# Patient Record
Sex: FEMALE | Race: WHITE
[De-identification: names, ages, dates, MRNs, and addresses within clinical notes are randomized per-mention and may not be internally consistent; named-entity substitution may affect disease eponyms.]

---

## 2021-09-09 ENCOUNTER — HOSPITAL ENCOUNTER (OUTPATIENT)
Age: 41
End: 2021-09-09
Payer: COMMERCIAL

## 2021-09-09 DIAGNOSIS — R50.9: Primary | ICD-10-CM

## 2021-09-09 PROCEDURE — 87635 SARS-COV-2 COVID-19 AMP PRB: CPT

## 2021-09-09 PROCEDURE — U0003 INFECTIOUS AGENT DETECTION BY NUCLEIC ACID (DNA OR RNA); SEVERE ACUTE RESPIRATORY SYNDROME CORONAVIRUS 2 (SARS-COV-2) (CORONAVIRUS DISEASE [COVID-19]), AMPLIFIED PROBE TECHNIQUE, MAKING USE OF HIGH THROUGHPUT TECHNOLOGIES AS DESCRIBED BY CMS-2020-01-R: HCPCS

## 2021-09-09 PROCEDURE — U0005 INFEC AGEN DETEC AMPLI PROBE: HCPCS

## 2024-08-01 ENCOUNTER — HOSPITAL ENCOUNTER (OUTPATIENT)
Age: 44
Discharge: HOME | End: 2024-08-01
Payer: COMMERCIAL

## 2024-08-01 DIAGNOSIS — R30.0: Primary | ICD-10-CM

## 2024-08-01 LAB
LEUKOCYTE ESTERASE UR QL STRIP: 500 /UL
MUCOUS THREADS URNS QL MICRO: (no result) /HPF
RBC UR QL: (no result) /HPF (ref 0–5)
SP GR UR: 1.01 (ref 1–1.03)
SQUAMOUS URNS QL MICRO: (no result) /HPF (ref 5–10)
URINE PRESERVATIVE: (no result)

## 2024-08-01 PROCEDURE — 87088 URINE BACTERIA CULTURE: CPT

## 2024-08-01 PROCEDURE — 87086 URINE CULTURE/COLONY COUNT: CPT

## 2024-08-01 PROCEDURE — 81001 URINALYSIS AUTO W/SCOPE: CPT

## 2024-08-01 NOTE — XMS RPT_ITS
Comprehensive CCD (C-CDA v2.1)  
  
                           Created on: 2024  
  
  
AmadoSHARON  
External Reference #: CDR,PersonID:[id number]  
: 1980  
Sex: Female  
  
Demographics  
  
  
                                        Address             [address] [place] [address]  [address]  
   
                                        Home Phone          [phone]  
   
                                        Mobile Phone        [phone]  
   
                                        Preferred Language  en  
   
                                        Marital Status        
   
                                        Caodaism Affiliation Unknown  
   
                                        Race                White  
   
                                        Ethnic Group        Not  or Lati  
no  
  
  
Author  
  
  
                                        Organization        [facility]  
  
  
Care Team Providers  
  
  
                                Care Team Member Name Role            Phone  
   
                                Anup Roberts Unavailable     Unavailable  
   
                                Nwaokafor, Ulisses [place] Unavailable     Unav  
ailable  
   
                                [place], Jana Unavailable     Unavailable  
   
                                Abbas, Rolfjjahid Unavailable     Unavailable  
   
                                Nwaokafor, Ulisses C Unavailable     Unavailable  
   
                                [employer]    Unavailable     Unavailable  
   
                                Corrao, Caitlyn    Unavailable     Unavailable  
   
                                Unavailable     Primary Care Provider UnavailErica Freed MD Primary Care Provider Unatiffany leavitt  
   
                                PROVIDER, UNKNOWN Admitting       Unavailable  
   
                                MERRICK BOSTON Attending       Unavailable  
   
                                Unavailable     Primary Care Provider Unavailabl  
e  
   
                                Unavailable     Primary Care Provider Unavailabl  
e  
  
  
  
Allergies  
  
  
                                                    Allergy   
Classification                          Reported   
Allergen(s)               Allergy Type              Date of   
Onset                     Reaction(s)               Facility  
   
                                                    Penicillins   
(antibiotic)  
(1 source)          Penicillins         Drug Allergy          
0                                       Hives, Nausea   
And Vomiting                            [facility]  
   
                                                    Sulfonamides   
(antibiotic)  
(1 source)                              Sulfonamides   
(Antibiotic)              Drug Allergy                
0                                                   [facility]  
   
                                                      
(3 sources)                             Sulfonamides   
(Antibiotic)    drug allergy                                    [facility]  
[facility]  
Work Phone:   
[phone]  
   
                                                      
(3 sources)                             Amoxicillin;   
Translations:   
[AMOXICILLIN]             Drug Allergy                
0                         Hives                     The   
[facility]   
[facility]   
[facility]  
   
                                                      
(3 sources)                             Penicillins;   
Translations:   
[PENICILLINS]                           Propensity to   
adverse   
reactions to   
drug   
(disorder)                                
0                         Hives                     The   
[facility]   
[facility]   
[facility]  
   
                                                      
(3 sources)                             Sulfonamides   
(Antibiotic);   
Translations:   
[SULFA   
ANTIBIOTICS]                            Propensity to   
adverse   
reactions to   
drug   
(disorder)                                
0                                                   The   
[facility]   
[facility]   
[facility]  
  
  
  
Medications  
Current Medications  
  
  
  
                      Medication Drug Class(es) Dates      Sig (Normalized) Sig   
(Original)  
   
                                                    docusate sodium 100   
mg oral capsule  
(2 sources)                                         Start:   
2017                              take 1 capsule by   
mouth twice daily   
at mealtime                             docusate sodium   
(COLACE) 100 MG   
capsule Take 1   
Capsule by mouth 2   
times daily (with   
meals). 60 Capsule   
3 2017   
Active  
   
                                                    escitalopram 20 mg   
oral tablet  
(1 source)                              Serotonin Reuptake   
Inhibitor                               Start:   
2021                              take 1 tablet by   
mouth once daily                        escitalopram   
(LEXAPRO) 20 MG   
tablet take 1   
tablet by mouth   
once daily 0   
2021 Active  
   
                                                    ferrous sulfate 325   
mg oral tablet  
(5 sources)                                         Start:   
2017                              take 1 tablet by   
mouth twice daily   
at mealtime                             ferrous sulfate   
325 (65 FE) MG   
tablet Take 1   
Tablet by mouth 2   
times daily (with   
meals). 60 Tablet   
3 2017   
Active  
   
                                                    ibuprofen 600 mg   
oral tablet  
(5 sources)                             Nonsteroidal   
Anti-inflammatory   
Drug                                    Start:   
2017                              take 1 tablet by   
mouth every six   
hours as needed                         ibuprofen (MOTRIN)   
600 MG tablet Take   
1 Tablet by mouth   
every 6 hours as   
needed (For cramps   
and engorgement).   
30 Tablet 3   
2017 Active  
  
  
  
                                                take 5 tablets by mouth once svitlana  
ly Ibuprofen 200 MG Oral Tablet 5 tablets daily  
  
Refills: 0 Active  
  
  
  
                                                    PRENATAL TABS tablet  
(2 sources)                             Start: 2017   take 1 tablet by   
mouth once daily                        PRENATAL TABS tablet   
Take 1 Tablet by mouth   
daily. 30 Tablet 11   
2017 Active  
  
  
  
Completed/Discontinued Medications  
  
  
  
                      Medication Drug Class(es) Dates      Sig (Normalized) Sig   
(Original)  
   
                                                    ascorbic acid 500 mg   
chewable tablet  
(3 sources)               Vitamin C                 Start:   
2019                              take 1 tablet by   
mouth three times   
daily                                   Vitamin C Oral   
Tablet Chewable TAKE   
1 TABLET 3 times   
daily Take 1 tablet   
with your Iron each   
time Quantity: 90   
Refills: 5 Leydi Luong MD Start :   
2019 Active  
   
                                                    gabapentin 100 mg   
oral capsule  
(3 sources)                             Anti-epileptic   
Agent                                   Start:   
2019                              take 3 capsules by   
mouth at bedtime,   
then take 1   
capsule by mouth   
three times daily                       Gabapentin 100 MG   
Oral Capsule Take 3   
capsules at bedtime   
starting 2 days   
before surgery. Take   
1 capsule 3 times   
daily after surgery,   
open capsule, take   
with SF pudding   
Quantity: 27   
Refills: 0 Leydi Luong MD Start :   
12-Dec-2019 Active  
   
                                                    omeprazole 40 mg   
delayed release oral   
capsule  
(6 sources)                             Proton Pump   
Inhibitor                               Start:   
2019                              take 1 capsule by   
mouth once daily                        Omeprazole 40 MG   
Oral Capsule Delayed   
Release TAKE 1   
CAPSULE Daily Open   
capsule, sprinkle in   
SF applesauce or   
pudding, swallow. DO   
NOT CHEW Quantity:   
30 Refills: 2 Leydi Luong MD Start :   
12-Dec-2019 Active  
   
                                                    ondansetron 4 mg   
disintegrating oral   
tablet  
(3 sources)                             Serotonin-3   
Receptor   
Antagonist                              Start:   
2019                              take 1-2 tablets   
by mouth every   
eight hours as   
needed for nausea                       Ondansetron 4 MG   
Oral Tablet   
Disintegrating take   
1-2 tablets every 8   
hours as needed for   
nausea Quantity: 30   
Refills: 2 Leydi Luong MD Start :   
12-Dec-2019 Active  
   
                                                    oxyCODONE   
hydrochloride 1   
mg/ml oral solution  
(3 sources)               Opioid Agonist            Start:   
2019                              take 5 mL by mouth   
every four hours   
as needed for pain                      oxyCODONE HCl - 5   
MG/5ML Oral Solution   
TAKE 5 ML Every 4   
hours PRN pain   
alternate with   
tylenol and heating   
pad Quantity: 180   
Refills: 0 Leydi Luong MD Start :   
12-Dec-2019 Active  
  
  
  
Problems  
Active Problems  
  
  
                                                    Problem   
Classification  Problem         Date            Documented Date Episodic/Chronic  
   
                                                    Coma; stupor; and   
brain damage  
(3 sources)                             Daytime somnolence;   
Translations:   
[Excessive daytime   
sleepiness]                                                 Episodic  
   
                                                    Diabetes or abnormal   
glucose tolerance   
complicating   
pregnancy;   
childbirth; or the   
puerperium  
(3 sources)                             Gestational diabetes   
mellitus;   
Translations:   
[Gestational diabetes   
mellitus]                                                   Episodic  
   
                                                    Esophageal disorders  
(3 sources)                             Gastroesophageal   
reflux disease;   
Translations: [GERD   
(gastroesophageal   
reflux disease)]                                            Chronic  
   
                                                    Headache; including   
migraine  
(9 sources)                             Migraine without aura;   
Translations:   
[Migraine]                                                  Chronic  
   
                                                    Headache; including   
migraine  
(3 sources)                             Daily headache;   
Translations: [Chronic   
daily headache]                                             Episodic  
   
                                                    Nausea and vomiting  
(3 sources)                             Postoperative nausea   
and vomiting;   
Translations:   
[Post-operative nausea   
and vomiting]                                               Episodic  
   
                                                    Nutritional   
deficiencies  
(3 sources)                             Vitamin D deficiency;   
Translations: [Vitamin   
D deficiency]                                               Chronic  
   
                                                    Other complications   
of pregnancy  
(3 sources)                             Multigravida of   
advanced maternal age;   
Translations: [AMA   
(advanced maternal   
age) multigravida 35+]                                         Episodic  
   
                                                    Other female genital   
disorders  
(1 source)                              Abnormal uterine   
bleeding;   
Translations:   
[Abnormal uterine and   
vaginal bleeding,   
unspecified]                                                Chronic  
   
                                                    Other nervous system   
disorders  
(3 sources)                             Postoperative pain ;   
Translations: [Post-op   
pain]                                                       Episodic  
   
                                                    Other nutritional;   
endocrine; and   
metabolic disorders  
(3 sources)                             Morbid obesity;   
Translations: [Morbid   
obesity]                                                    Chronic  
   
                                                    Other nutritional;   
endocrine; and   
metabolic disorders  
(3 sources)                             Body mass index 40+ -   
severely obese;   
Translations: [Morbid   
obesity with BMI of   
45.0-49.9, adult]                                           Chronic  
   
                                                    Other nutritional;   
endocrine; and   
metabolic disorders  
(3 sources)                             Disorder of iron   
metabolism;   
Translations:   
[Disorder of iron   
metabolism,   
unspecified]                                                Chronic  
   
                                                    Other nutritional;   
endocrine; and   
metabolic disorders  
(3 sources)                             Personal history of   
other endocrine,   
nutritional and   
metabolic disease;   
Translations: [History   
of vitamin D   
deficiency]                                                 Episodic  
   
                                                    Residual codes;   
unclassified  
(3 sources)                             Obstructive sleep   
apnea syndrome;   
Translations: [KATHY   
(obstructive sleep   
apnea)]                                                     Chronic  
   
                                                    Screening and   
history of mental   
health and substance   
abuse codes  
(3 sources)                             H/O: anxiety state;   
Translations: [History   
of anxiety]                                                 Episodic  
   
                                                    Sprains and strains  
(6 sources)                             Sprain of foot;   
Translations: [Sprain   
of ankle]                                                   Episodic  
   
                                                    Unclassified  
(1 source)                              Unspecified injury of   
unspecified foot,   
initial encounter /   
S99.929A(ICD-10)                        Onset:   
2018                                            
   
                                                    Unclassified  
(1 source)                              Unspecified injury of   
left foot, initial   
encounter /   
S99.922A(ICD-10)                        Onset:   
2018                                            
  
  
Past or Other Problems  
  
  
                                                    Problem   
Classification  Problem         Date            Documented Date Episodic/Chronic  
   
                                                    Early or threatened   
labor  
(2 sources)                             Labor finding;   
Translations: [False   
labor, unspecified]                     Onset:   
2017                Episodic  
   
                                                    Other complications   
of pregnancy  
(4 sources)                             High risk pregnancy;   
Translations:   
[Supervision of high   
risk pregnancy,   
unspecified, third   
trimester]                              Onset:   
2010  
Resolved:   
2016                Episodic  
   
                                                    Other hematologic   
conditions  
(2 sources)                             Alpha-fetoprotein   
raised;   
Translations:   
[Abnormality of   
alphafetoprotein]                       Onset:   
10-                10-                Episodic  
   
                                                    Other pregnancy and   
delivery including   
normal  
(2 sources)                             Vaginal delivery;   
Translations:   
[Encounter for   
full-term   
uncomplicated   
delivery]                               Onset:   
2015                Episodic  
   
                                                    Unclassified  
(1 source)                              Unspecified injury   
of unspecified foot,   
initial encounter;   
Translations:   
[Unspecified injury   
of unspecified foot,   
initial encounter]                      Onset:   
2018                                            
   
                                                    Unclassified  
(12 sources)                            Patient encounter   
status;   
Translations:   
[Pre-bariatric   
surgery nutrition   
evaluation]                                                   
   
                                                    Unclassified  
(3 sources)                             Injury of left foot;   
Translations: [Foot   
injury, left,   
initial encounter]                                            
   
                                                    Unclassified  
(3 sources)                             History of bypass of   
stomach;   
Translations: [S/P   
gastric bypass]                                               
   
                                                    NEGATED: Highlighted   
row has not   
occurred!Residual   
codes; unclassified  
(20 sources)    Disease                                         Episodic  
  
  
  
Results  
  
  
                          Test Name    Value        Interpretation Reference   
Range                                   Facility  
   
                                                    R[facility]IOon 2022   
   
                                        CNOV                Office Visit ([facility]  
)  
-------------------------  
-----  
SHARON FISCHER   
([id number]) 1980 F  
Date Time Provider   
Department  
22 11:40 AM [facility]  
During your visit today,   
we recorded the following   
information about you:  
Temperature Pulse   
Respiration Blood   
pressure  
98.4 degrees 62/minute   
12/minute 107/59  
Weight Last Period  
90.3 kg 21  
BAILEE Guzman.CNP   
2022 12:01 PM Signed  
This note was created   
using [employer].  
Subjective  
Sharon Fishcer is a 41   
year old female.  
Pt reports left sided   
throat pain and swelling   
x 2 days. Pain refers   
into left  
ear. No URI symptoms,   
otherwise feels well.  
The history is provided   
by the patient.  
Sore Throat  
This is a new problem.   
Episode onset: 2 days.   
The problem has been   
gradually  
worsening. The pain is   
worse on the left side.   
There has been no fever.  
Associated symptoms   
include ear pain (left),   
neck pain and swollen   
glands.  
Pertinent negatives   
include no congestion,   
coughing, headaches or   
trouble  
swallowing. She has had   
no exposure to strep. She   
has tried acetaminophen   
for  
the symptoms. The   
treatment provided mild   
relief.  
Review of Systems  
Constitutional: Negative   
for chills, fatigue and   
fever.  
HENT: Positive for ear   
pain (left) and sore   
throat. Negative for   
congestion,  
postnasal drip,   
rhinorrhea, sinus pain   
and trouble swallowing.  
Respiratory: Negative for   
cough.  
Cardiovascular: Negative   
for chest pain.  
Musculoskeletal: Positive   
for neck pain.  
Allergic/Immunologic:   
Negative for   
immunocompromised state.  
Neurological: Negative   
for headaches.  
Hematological: Positive   
for adenopathy.  
PAST MEDICAL HISTORY  
Diagnosis Date  
- Eczema  
PAST SURGICAL HISTORY  
Procedure Laterality Date  
- GASTRIC BYPASS HX   
2019  
- NONE  
ALLERGIES Amoxicillin   
(Bulk) and Sulfa   
(Sulfonamide Antibiotics)  
MEDICATIONS  
escitalopram oxalate   
(LEXAPRO) 20 mg tablet   
Take 1 tablet by mouth   
once daily.  
clindamycin (CLEOCIN) 300   
mg capsule Take 1 capsule   
by mouth three times   
daily  
for 7 days.  
prenatal vits   
w-ca,fe,fa(<1mg)(PRENATAL   
FORMULA TAB) Take one(1)   
tablet daily.  
History reviewed. No   
pertinent family history.  
Social History  
Tobacco Use  
- Smoking status: Current   
Every Day Smoker  
Types: Cigarettes  
- Smokeless tobacco:   
Never Used  
- Tobacco comment: 6 per   
day  
Substance Use Topics  
- Alcohol use: No  
- Drug use: No  
Objective  
/59   Pulse 62     
Temp 36.9 ?C (98.4 ?F)   
(Temporal Artery)   Resp   
12    
Wt 90.3 kg (199 lb)   LMP   
2021 (Approximate)   
  SpO2 100%  
Physical Exam  
Vitals and nursing note   
reviewed.  
Constitutional:  
Appearance: She is   
well-developed. She is   
not ill-appearing.  
HENT:  
Mouth/Throat:  
Mouth: Mucous membranes   
are moist.  
Pharynx: Uvula midline.   
Posterior oropharyngeal   
erythema present. No   
uvula  
swelling.  
Tonsils: No tonsillar   
exudate. 1+ on the right.   
3+ on the left.  
Pulmonary:  
Effort: Pulmonary effort   
is normal.  
Lymphadenopathy:  
Cervical: Cervical   
adenopathy present.  
Right cervical: No   
superficial or deep   
cervical adenopathy.  
Left cervical:   
Superficial cervical   
adenopathy and deep   
cervical adenopathy  
present.  
Skin:  
General: Skin is warm and   
dry.  
Neurological:  
Mental Status: She is   
alert and oriented to   
person, place, and time.  
Assessment and Plan  
1. Acute tonsillitis,   
unspecified etiology  
Concern for possible   
early tonsillar abscess,   
will begin antibiotic   
treatment.  
If severe worsening or   
inability to swallow   
saliva/liquids, go to ER.   
If no  
improvement in the next   
2-3 days, worsening, see   
ENT for further   
evaluation.  
Supportive care with   
analgesics, warm salt   
water gargles, throat  
lozenges/sprays,   
increased fluids, rest.  
- clindamycin (CLEOCIN)   
300 mg capsule; Take 1   
capsule by mouth three   
times  
daily for 7 days.   
Dispense: 21 capsule;   
Refill: 0  
Referring Provider: SELF   
[200]  
Allergies As of Date:   
2022 Noted Allergy   
Reaction  
AMOXICILLIN (BULK)   
2011 4 - Hives  
SULFA (SULFONAMIDE   
ANTIBIOTICS) 2010  
Date Reviewed: 2022  
Reviewed by: BAILEE Guzman.CNP - Fully   
Assessed  
Reason for Visit:  
Sore Throat [200] Cmt: x   
2 days  
Ear Pain [817] Cmt: left   
ear pain x 2 days. No   
fever  
Primary Visit   
Diagnosis:Acute   
tonsillitis, unspecified   
etiology [J03.90]  
Order(s):clindamycin   
(CLEOCIN) 300 mg   
capsuleTake 1 capsule by   
mouth three  
times daily for 7   
days.Disp: 21 capsuleRfl:   
0  
Prescriptions as of   
2022  
- escitalopram oxalate   
(LEXAPRO) 20 mg tablet  
Take 1 tablet by mouth   
once daily.  
- clindamycin (CLEOCIN)   
300 mg capsule  
Take 1 capsule by mouth   
three times daily for 7   
days.  
- prenatal vits   
w-ca,fe,fa(<1mg)(PRENATAL   
FORMULA TAB)  
Take one(1) tablet daily.  
Problem List As Of Date:   
2022  
(None)  
Prescriptions ordered   
this encounter Disp   
Refills Start End  
CLINDAMYCIN  MG   
CAPSULE 21 c* 0   
2022  
Route: ORAL  
Sig: Take 1 capsule by   
mouth three times daily   
for 7 days.  
Medicati (more content   
not included)...    Normal                                  [facility]  
   
                                                    CBC Auto DifferentialOrdered  
 By: Smiley Rodriguez on 05-   
   
                          Absolute Baso # 0.1 10*3/uL               0.0 - 0.2   
10*3/uL                                 [facility]  
Work Phone:   
[phone]  
22  
   
                          Absolute Neut # 2.5 10*3/uL               1.8 - 7.0   
10*3/uL                                 [facility]  
Work Phone:   
[phone]  
22  
   
                                                    Basophils/100 WBC   
(Bld)           1.2 %                           0.0 - 2.0 %     [facility]  
Work Phone:   
[phone]  
22  
   
                                                    Eosinophils (Bld)   
[#/Vol]             0.3 10*3/uL                             0.0 - 0.5   
10*3/uL                                 [facility]  
Work Phone:   
[phone]  
[phone]  
   
                                                    Eosinophils/100 WBC   
(Bld)           6.4 %           High            1.0 - 6.0 %     [facility]  
Work Phone:   
[phone]  
[phone]  
   
                                                    Granulocytes/100 WBC   
(Bld)               54.2 %                                  40.0 - 80.0   
%                                       [facility]  
Work Phone:   
1  
   
                                                    Hematocrit (Bld)   
[Volume fraction]   35.2 %                                  35.0 - 47.0   
%                                       [facility]  
Work Phone:   
1  
   
                                                    Hemoglobin.gastrointe  
stinal spec 1 Ql   
(Stl)               12.1 g/dL                               11.7 - 16.0   
g/dL                                    [facility]  
Work Phone:   
  
   
                                                    Interpretation and   
review of laboratory   
results         Abnormal                                        [facility]  
Work Phone:   
1  
   
                                                    Lymphocytes (Bld)   
[#/Vol]             1.4 10*3/uL                             1.0 - 4.3   
10*3/uL                                 [facility]  
Work Phone:   
1  
   
                                                    Lymphocytes/100 WBC   
(Bld)               30.8 %                                  20.0 - 40.0   
%                                       [facility]  
Work Phone:   
  
   
                                                    MCH (RBC) [Entitic   
mass]               30.1 pg                                 26.0 - 34.0   
pg                                      [facility]  
Work Phone:   
  
   
                          MCHC (RBC) [Mass/Vol] 34.5 %                    32.0 -  
 36.0   
%                                       [facility]  
Work Phone:   
  
   
                                                    MCV (RBC) [Entitic   
vol]                87.3 fL                                 79.0 - 98.0   
fL                                      [facility]  
Work Phone:   
  
   
                                                    Monocytes (Bld)   
[#/Vol]             0.3 10*3/uL                             0.0 - 0.8   
10*3/uL                                 [facility]  
Work Phone:   
  
   
                                                    Monocytes/100 WBC   
(Bld)               7.4 %                                   2.0 - 10.0   
%                                       [facility]  
Work Phone:   
  
   
                                                    Platelet distribution   
width (Bld) [Ratio] 12.3 %                                  11.5 - 14.5   
%                                       [facility]  
Work Phone:   
  
   
                                                    Platelet mean volume   
(Bld) [Entitic vol] 6.8 fL              Low                 7.4 - 10.4   
fL                                      [facility]  
Work Phone:   
  
   
                                                    Platelets (Bld)   
[#/Vol]             316 10*3/uL                             140 - 440   
10*3/uL                                 [facility]  
Work Phone:   
  
   
                          RBC (Bld) [#/Vol] 4.03 10*6/uL              3.80 - 5.2  
0   
10*6/uL                                 [facility]  
Work Phone:   
[phone]  
[phone]  
   
                          WBC (Bld) [#/Vol] 4.6 10*3/uL               3.6 - 10.7  
   
10*3/uL                                 [facility]  
Work Phone:   
[phone]  
  
   
                                                            Test Performed by [facility], [address] ,   
[place], [place] [address]                                         [facility]  
Work Phone:   
[phone]  
  
   
                                                    HCG, Quantitative, Pregnancy  
Ordered By: Smiley Rodriguez on 05-   
   
                      hCG Quant  <2                    m[IU]/mL   [facility]  
Work Phone:   
[phone]  
   
                                        Comment on above:   Females < 5  
Values in pregnancy should double every 2 to 3 days for  
the first 6 weeks.Elevated concentrations of human  
chorionic gonadotropin (hCG) measured in the first  
trimester of pregnancy are observed in normal pregnancy,  
but may serve as an indication of chorionic carcinoma,  
hydatiform mole, or multiple pregnancy.Decreasing hCG  
concentrations indicate threatened or missed ,  
recent termination of pregnancy, ectopic pregnancy,  
gestosis or intrauterine death. Jenni- and postmenopausal  
females may have detectable hCG concentrations  
(< or = to 14 mIU/mL) due to pituitary production of hCG.  
Serum follicle-stimulating hormone measurement may aid  
in ruling-out pregnancy in this population. Cutoffs of  
greater than 20 to 45 mIU/mL have been suggested and are  
method dependent. False-elevations (called phantom human  
chorionic gonadotropin: hCG) may occur with patients who  
have human antianimal or heterophilic antibodies.  
Some specimens may not dilute linearly due to abnormal  
forms of hCG. Elevated hCG concentrations not associated  
with pregnancy are found in patients with other diseases  
such as tumors of the germ cells, ovaries, bladder,  
pancreas, stomach, lungs, and liver. This test is not  
intended to detect or monitor tumors or gestational  
trophoblastic disease.  
   
   
                                                            Test Performed by [facility], [address] ,   
[place], [place] [address]                                         [employer] Phone:   
[phone]  
   
                                                    Hemogram w/ [facility] 05-10  
-2021   
   
                      Abs Baso Cnt 0.1 10*3/uL Normal     0.0-0.2    [facility]   
[facility]  
   
                                        Comment on above:   Performed By: #### Q  
WNT4, HEMDF, TSH5 ####  
[facility]  
[address]  
[place] [address]  
#### PRLA3 ####  
[facility]  
[address] [place] [address]   
   
                      Abs Neutrophile Cnt 2.5 10*3/uL Normal     1.8-7.0    [facility]  
   
                                        Comment on above:   Performed By: #### Q  
WNT4, HEMDF, TSH5 ####  
[facility]  
[address]  
[place] [address]  
#### PRLA3 ####  
[facility]  
[address] [place] [address]   
   
                                                    Basophils/100 WBC   
(Bld)           1.2 %           Normal          0.0-2.0         [facility]  
   
                                        Comment on above:   Performed By: #### Q  
WNT4, HEMDF, TSH5 ####  
[facility]  
[address]  
[place] [address]  
#### PRLA3 ####  
[place] [address] [place] [address]   
   
                                                    Eosinophils (Bld)   
[#/Vol]         0.3 10*3/uL     Normal          0.0-0.5         [facility]  
   
                                        Comment on above:   Performed By: #### Q  
WNT4, HEMDF, TSH5 ####  
[facility]  
[address]  
[place] [address]  
#### PRLA3 ####  
[facility]  
[address] [place] [address]   
   
                                                    Eosinophils/100 WBC   
(Bld)           6.4 %           High            1.0-6.0         [facility]  
   
                                        Comment on above:   Performed By: #### Q  
WNT4, HEMDF, TSH5 ####  
[facility]  
[address]  
[place] [address]  
#### PRLA3 ####  
[facility]  
[address] [place] [address]   
   
                                                    Erythrocyte   
distribution width   
(RBC) [Ratio]   12.3 %          Normal          11.5-14.5       [facility]  
   
                                        Comment on above:   Performed By: #### Q  
WNT4, HEMDF, TSH5 ####  
[facility]  
[address]  
[place] [address]  
#### PRLA3 ####  
[facility]  
[address] [place] [address]   
   
                                                    Granulocytes/100 WBC   
(Bld)           54.2 %          Normal          40.0-80.0       [facility]  
   
                                        Comment on above:   Performed By: #### Q  
WNT4, HEMDF, TSH5 ####  
[facility]  
[address]  
[place] [address]  
#### PRLA3 ####  
[facility]  
[address] [place] [address]   
   
                                                    Hematocrit (Bld)   
[Volume fraction] 35.2 %          Normal          35.0-47.0       [facility]  
   
                                        Comment on above:   Performed By: #### Q  
WNT4, HEMDF, TSH5 ####  
[facility]  
[address]  
[place] [address]  
#### PRLA3 ####  
[facility]  
[address] [place] [place] [address]   
   
                                                    Hemoglobin (Bld)   
[Mass/Vol]      12.1 g/dL       Normal          11.7-16.0       [facility]  
   
                                        Comment on above:   Performed By: #### Q  
WNT4, HEMDF, TSH5 ####  
[facility]  
[address]  
[place] [address]  
#### PRLA3 ####  
[facility]  
[address] [place] [place] [address]   
   
                                                    Lymphocytes (Bld)   
[#/Vol]         1.4 10*3/uL     Normal          1.0-4.3         [facility]  
   
                                        Comment on above:   Performed By: #### Q  
WNT4, HEMDF, TSH5 ####  
[facility]  
[address]  
[place] [address]  
#### PRLA3 ####  
[facility]  
[address] [place] [address]   
   
                                                    Lymphocytes/100 WBC   
(Bld)           30.8 %          Normal          20.0-40.0       [facility]  
   
                                        Comment on above:   Performed By: #### Q  
WNT4, HEMDF, TSH5 ####  
[facility]  
[address]  
[place] [address]  
#### PRLA3 ####  
[facility]  
[address] [place] [place] [address]   
   
                                                    MCH (RBC) [Entitic   
mass]           30.1 pg         Normal          26.0-34.0       [facility]  
   
                                        Comment on above:   Performed By: #### Q  
WNT4, HEMDF, TSH5 ####  
[facility]  
[address] [place]  
[place] [address]  
#### PRLA3 ####  
[facility]  
[address] [place] [address]   
   
                      MCHC       34.5 %     Normal     32.0-36.0  [facility]  
   
                                        Comment on above:   Performed By: #### Q  
WNT4, HEMDF, TSH5 ####  
[facility]  
[address]  
[place] [address]  
#### PRLA3 ####  
[facility]  
[address] [place] [place] [address]   
   
                                                    MCV (RBC) [Entitic   
vol]            87.3 fL         Normal          79.0-98.0       [facility]  
   
                                        Comment on above:   Performed By: #### Q  
WNT4, HEMDF, TSH5 ####  
[facility]  
[address]  
[place] [address]  
#### PRLA3 ####  
[facility]  
[address] [place] [address]   
   
                                                    Monocytes (Bld)   
[#/Vol]         0.3 10*3/uL     Normal          0.0-0.8         [facility]  
   
                                        Comment on above:   Performed By: #### Q  
WNT4, HEMDF, TSH5 ####  
[facility]  
[address]  
[place] [address]  
#### PRLA3 ####  
[facility]  
[address] [place] [address]   
   
                                                    Monocytes/100 WBC   
(Bld)           7.4 %           Normal          2.0-10.0        [facility]  
   
                                        Comment on above:   Performed By: #### Q  
WNT4, HEMDF, TSH5 ####  
[facility]  
[address] [place]  
[place] [address]  
#### PRLA3 ####  
[facility]  
[address] [place] [address]   
   
                                                    Platelet mean volume   
(Bld) [Entitic vol] 6.8 fL          Low             7.4-10.4        [facility]  
   
                                        Comment on above:   Performed By: #### Q  
WNT4, HEMDF, TSH5 ####  
[facility]  
[address]  
[place] [address]  
#### PRLA3 ####  
[facility]  
[address] [place] [address]   
   
                                                    Platelets (Bld)   
[#/Vol]         316 10*3/uL     Normal          140-440         [facility]  
   
                                        Comment on above:   Performed By: #### Q  
WNT4, HEMDF, TSH5 ####  
[facility]  
[address]  
[place] [address]  
#### PRLA3 ####  
[facility]  
[address] [place] [address]   
   
                      RBC (Bld) [#/Vol] 4.03 10*6/uL Normal     3.80-5.20  [facility]  
   
                                        Comment on above:   Performed By: #### Q  
WNT4, HEMDF, TSH5 ####  
[facility]  
[address]  
[place] [address]  
#### PRLA3 ####  
[facility]  
[address] [place] [address]   
   
                      WBC (Bld) [#/Vol] 4.6 10*3/uL Normal     3.6-10.7   [facility]  
   
                                        Comment on above:   Performed By: #### Q  
WNT4, HEMDF, TSH5 ####  
[facility]  
[address]  
[place] [address]  
#### PRLA3 ####  
[facility]  
[address] [place] [address]   
   
                                                    Prolactinon 05-   
   
                      Prolactin  6.7 ng/mL  Normal     2.8-27.0   [facility]  
   
                                        Comment on above:   Result Comment: Valu  
es below 35 ng/mL may be of doubtful   
significance. Recommend send-out  
testing to rule out macroprolactin to confirm the result.   
   
                                                            Performed By: #### Q  
WNT4, HEMDF, TSH5 ####  
[facility]  
[address]  
[place] [address]  
#### PRLA3 ####  
[facility]  
[address] [place] [address]   
   
                                                    ProlactinOrdered By: Smiley Rodriguez on 05-   
   
                          Prolactin    6.7 ng/mL                 2.8 - 27.0   
ng/mL                                   [facility]  
Work Phone:   
[phone]  
   
                                        Comment on above:   Values below 35 ng/m  
L may be of doubtful significance.   
Recommend   
send-out  
testing to rule out macroprolactin to confirm the result.  
  
   
   
                                                            Test Performed by   
[facility], [address], [place], [place]   
[address]                                                       [facility]  
Work Phone:   
[phone]  
   
                                                    TSH without ReflexOrdered By  
: Smiley Rodriguez on 05-   
   
                          TSH Qn       1.654 u[IU]/mL              0.465 -   
4.680   
u[IU]/mL                                [facility]  
Work Phone:   
[phone]  
   
                                                            Test Performed by [facility], [address] ,   
[place], [place] [address]                                         [facility]  
Work Phone:   
1  
   
                                                    Thyroid Stim. Hormoneon    
   
                      Thyroid Stim. Hormone 1.654 u[IU]/mL Normal     0.465-4.68  
0 [facility]  
   
                                        Comment on above:   Performed By: #### Q  
WNT4, HEMDF, TSH5 ####  
[facility]  
[address]  
[place] [address]  
#### PRLA3 ####  
[facility]  
[address] [place]  
[place] [address]   
   
                                                    hCG Quantitativeon 05-  
1   
   
                      hCG Quantitative < 2        Normal                [facility]  
   
                                        Comment on above:   Result Comment: Fema  
les < 5  
Values in pregnancy should double every 2 to 3 days for  
the first 6 weeks.Elevated concentrations of human  
chorionic gonadotropin (hCG) measured in the first  
trimester of pregnancy are observed in normal pregnancy,  
but may serve as an indication of chorionic carcinoma,  
hydatiform mole, or multiple pregnancy.Decreasing hCG  
concentrations indicate threatened or missed ,  
recent termination of pregnancy, ectopic pregnancy,  
gestosis or intrauterine death. Jenin- and postmenopausal  
females may have detectable hCG concentrations  
(< or = to 14 mIU/mL) due to pituitary production of hCG.  
Serum follicle-stimulating hormone measurement may aid  
in ruling-out pregnancy in this population. Cutoffs of  
greater than 20 to 45 mIU/mL have been suggested and are  
method dependent. False-elevations (called phantom human  
chorionic gonadotropin: hCG) may occur with patients who  
have human antianimal or heterophilic antibodies.  
Some specimens may not dilute linearly due to abnormal  
forms of hCG. Elevated hCG concentrations not associated  
with pregnancy are found in patients with other diseases  
such as tumors of the germ cells, ovaries, bladder,  
pancreas, stomach, lungs, and liver. This test is not  
intended to detect or monitor tumors or gestational  
trophoblastic disease.   
   
                                                            Performed By: #### Q  
WNT4, HEMDF, TSH5 ####  
[facility]  
[address]  
[place] [address]  
#### PRLA3 ####  
[facility]  
[address] [place]  
[place] [address]   
   
                                                    [facility]ARRISON DIGITAL SCREEN RUSS Hagenmary 02-   
   
                                                            Patient Name: SHARON FISCHER MRN: [id number]   
Acct#: [id number]   
Mammography ACCESSION   
EXAM DATE/TIME PROCEDURE   
ORDERING PROVIDER   
[id number] 2/15/2021   
09:50 EST MG Breast   
Tomosynthesis MD ESTELA, ERICA LIANG BI Scr CPT   
code 73717 26768 Reason   
For Exam (MG Breast   
Tomosynthesis BI Scr)   
screening Report TIME   
SINCE LAST MAMMOGRAM:   
Baseline mammogram.   
REASON FOR EXAM:   
screening, asymptomatic.   
PROCEDURE: MG BREAST   
TOMOSYNTHESIS BL SCR:   
FEBRUARY 15, 2021 -   
ACCESSION #: [id number]   
2D/3D Procedure 3D   
Bilateral CC and MLO   
view(s) were taken. 2D   
Bilateral CC and MLO   
view(s) were taken. No   
prior studies available   
for comparison. TISSUE   
DENSITY: BIRADS B - There   
are scattered   
fibroglandular densities.   
. FINDINGS: No suspicious   
masses, architectural   
distortions or   
suspiciously clustered   
microcalcifications are   
identified. There is no   
evidence of skin   
thickening or nipple   
retraction. This was a   
baseline study.   
Therefore, there were no   
studies used for   
comparison. Markings on   
images: BB's = Nipples;   
skin lesions [place]   
= Palpable Line = Scar 2D   
digital mammography and   
tomosynthesis imaging   
were performed and   
reviewed with CAD.   
ASSESSMENT: Category 1   
Negative RECOMMENDATION:   
Routine screening   
mammogram of both breasts   
in 1 year. . Report   
Dictated on Workstation:   
[id number] Cancer Risk   
Assessment: This risk   
assessment is based on   
patient provided   
information collected in   
a risk survey taken at   
the time of this   
examination. Lifetime   
breast cancer risk:   
15.01% - If greater than   
or equal to 20%, consider   
annual mammogram and   
annual screening Breast   
MRI or follow Mammography   
Report up in high risk   
clinic. Is the patient at   
elevated risk based on   
the HBOC criteria? No   
(Hereditary Breast and   
Ovarian Cancer) - If yes,   
consider genetic   
counseling and testing   
with high risk follow up.   
HNPCC mutation risk   
(Lomas Syndrome): 1% - if   
greater than or equal to   
5%, consider genetic   
counseling, testing and   
screening colonoscopy.   
---** Final ---** Signed   
Date and Time: 02/15/2021   
11:48 am Signed by:   
MD CLEMENTE, FRANCISCO VALLEJO  
Work Phone:   
[phone]  
   
                                                            [facility] Incoming   
[facility]   
[facility] 1[facility]07   
PM EST Patient Name:   
SHARON FISCHER  
  
  
MRN: [id number]  
  
  
Acct#: [id number]  
  
  
Mammography  
  
  
ACCESSION EXAM DATE/TIME   
PROCEDURE ORDERING   
PROVIDER  
[id number] 2/15/2021   
09:50 EST MG Breast   
Tomosynthesis MD ESTELA,  
ERICA LIANG  
BI Scr  
  
  
CPT code  
61531  
02238  
  
  
Reason For Exam  
(MG Breast Tomosynthesis   
BI Scr) screening  
  
Report  
TIME SINCE LAST   
MAMMOGRAM: Baseline   
mammogram.  
  
REASON FOR EXAM:   
screening, asymptomatic.  
  
  
PROCEDURE: MG BREAST   
TOMOSYNTHESIS BL SCR:   
FEBRUARY 15, 2021 -   
ACCESSION #: [id number]  
2D/3D Procedure  
3D Bilateral CC and MLO   
view(s) were taken.  
2D Bilateral CC and MLO   
view(s) were taken.  
  
No prior studies   
available for comparison.  
  
TISSUE DENSITY: BIRADS B   
- There are scattered   
fibroglandular densities.   
.  
  
FINDINGS:  
No suspicious masses,   
architectural distortions   
or suspiciously clustered   
microcalcifications  
are  
identified. There is no   
evidence of skin   
thickening or nipple   
retraction.  
This was a baseline   
study. Therefore, there   
were no studies used for   
comparison.  
  
Markings on images:  
BB's = Nipples; skin   
lesions  
[place] = Palpable  
Line = Scar  
  
2D digital mammography   
and tomosynthesis imaging   
were performed and   
reviewed with CAD.  
  
  
ASSESSMENT: Category 1   
Negative  
RECOMMENDATION: Routine   
screening mammogram of   
both breasts in 1 year.  
.  
Report Dictated on   
Workstation: [id number]  
  
  
  
Cancer Risk Assessment:  
This risk assessment is   
based on patient provided   
information collected in   
a risk survey taken  
at  
the  
time of this examination.  
  
Lifetime breast cancer   
risk: 15.01%  
- If greater than or   
equal to 20%, consider   
annual mammogram and   
annual screening Breast   
MRI or  
follow  
Mammography  
  
  
Report  
up in high risk clinic.  
  
Is the patient at   
elevated risk based on   
the HBOC criteria? No  
(Hereditary Breast and   
Ovarian Cancer)  
- If yes, consider   
genetic counseling and   
testing with high risk   
follow up.  
  
HNPCC mutation risk   
(Lomas Syndrome): 1%  
- if greater than or   
equal to 5%, consider   
genetic counseling,   
testing and screening  
colonoscopy.  
  
---** Final ---**  
  
  
  
Signed Date and Time:   
02/15/2021 11:48 am  
Signed by: MD CLEMENTE,   
FRANCISCO VALLEJO  
Work Phone:   
[id number]  
22  
   
                                                    MG Breast Tomosynthesis Scr   
Blon 02-   
   
                                                    MG Breast   
Tomosynthesis Scr Bl                    Patient Name: SHARON FISCHER  
MRN: [id number]  
Acct#: [id number]  
Mammography  
ACCESSION EXAM DATE/TIME   
PROCEDURE ORDERING   
PROVIDER  
[id number] 2/15/2021   
09:50 EST MG Breast   
Tomosynthesis MD ESTELA, ERICA LIANG  
BI Scr  
CPT code  
18121  
33359  
Reason For Exam  
(MG Breast Tomosynthesis   
BI Scr) screening  
Report  
TIME SINCE LAST   
MAMMOGRAM: Baseline   
mammogram.  
REASON FOR EXAM:   
screening, asymptomatic.  
PROCEDURE: MG BREAST   
TOMOSYNTHESIS BL SCR:   
FEBRUARY 15, 2021 -   
ACCESSION #: [id number]  
2D/3D Procedure  
3D Bilateral CC and MLO   
view(s) were taken.  
2D Bilateral CC and MLO   
view(s) were taken.  
No prior studies   
available for comparison.  
TISSUE DENSITY: BIRADS B   
- There are scattered   
fibroglandular densities.   
.  
FINDINGS:  
No suspicious masses,   
architectural distortions   
or suspiciously clustered   
microcalcifications  
are  
identified. There is no   
evidence of skin   
thickening or nipple   
retraction.  
This was a baseline   
study. Therefore, there   
were no studies used for   
comparison.  
Markings on images:  
BB's = Nipples; skin   
lesions  
Open [place] = Palpable  
Line = Scar  
2D digital mammography   
and tomosynthesis imaging   
were performed and   
reviewed with CAD.  
ASSESSMENT: Category 1   
Negative  
RECOMMENDATION: Routine   
screening mammogram of   
both breasts in 1 year.  
.  
Report Dictated on   
Workstation: [id number]  
Cancer Risk Assessment:  
This risk assessment is   
based on patient provided   
information collected in   
a risk survey taken at  
the  
time of this examination.  
Lifetime breast cancer   
risk: 15.01%  
- If greater than or   
equal to 20%, consider   
annual mammogram and   
annual screening Breast   
MRI or  
follow  
Mammography  
Report  
up in high risk clinic.  
Is the patient at   
elevated risk based on   
the HBOC criteria? No  
(Hereditary Breast and   
Ovarian Cancer)  
- If yes, consider   
genetic counseling and   
testing with high risk   
follow up.  
HNPCC mutation risk   
(Lomas Syndrome): 1%  
- if greater than or   
equal to 5%, consider   
genetic counseling,   
testing and screening   
colonoscopy.  
***** Final *****  
  
  
  
Signed Date and Time:   
02/15/2021 11:48 am  
Signed by: MD CLEMENTE,   
FRANCISCO               Normal                                  [facility]  
   
                                                    VIT B1-THIAMINE WHOLE BLDon   
03-   
   
                                                    VIT B1-THIAMINE WHOLE   
BLD             80 nmol/L       Normal                    [facility]  
   
                                        Comment on above:   Result Comment: INTE  
RPRETIVE INFORMATION: Vitamin B1, Whole   
Blood  
This assay measures the concentration of thiamine diphosphate  
(TDP), the primary active form of vitamin B1. Approximately 90  
percent of vitamin B1 present in whole blood is TDP. Thiamine   
and  
thiamine monophosphate, which comprise the remaining 10 percent,  
are not measured.  
Test developed and characteristics determined by [facility]. See Compliance Statement B: whoactually/CS  
Performed by [facility],  
[address] [place] [id number] [phone]  
[email or web address], Alex Castellano MD, Lab. Director   
   
                                                            Performed By: #### P  
TINR ####  
[facility]  
[address] [place] [address]   
   
                                                    VITAMIN Aon 03-   
   
                      VITAMIN A  37.9 ug/dL Normal     20.1-62.0  [facility]  
   
                                        Comment on above:   Result Comment: Refe  
rence intervals for vitamin A determined   
from [facility] internal  
studies. Individuals with vitamin A less than 20 ug/dL are   
considered  
vitamin A deficient and those with serum concentrations less   
than  
10 ug/dL are considered severely deficient.  
This test was developed and its performance characteristics  
determined by [employer]. It has not been cleared or approved  
by the Food and Drug Administration.   
   
                                                            Performed By: #### P  
TINR ####  
[facility]  
[address] [place] [address]   
   
                                                    COPPERon 2020   
   
                      COPPER     102 ug/dL  Normal          [facility]  
   
                                        Comment on above:   Result Comment: Dete  
ction Limit = 5  
Test(s) 001586-Copper, Serum; 001800-Zinc, Plasma or Serum  
was developed and its performance characteristics determined  
by [facility]. It has not been cleared or approved by the Food  
and Drug Administration.   
   
                                                            Performed By: #### P  
TINR ####  
[facility]  
[address] [place] [address]   
   
                                                    ZINCon 2020   
   
                      ZINC       73 ug/dL   Normal          [facility]  
   
                                        Comment on above:   Result Comment: Dete  
ction Limit = 5  
Test(s) 001586-Copper, Serum; 001800-Zinc, Plasma or Serum  
was developed and its performance characteristics determined  
by [facility]. It has not been cleared or approved by the Food  
and Drug Administration.   
   
                                                            Performed By: #### P  
TINR ####  
[facility]  
[address] [place] [address]   
   
                                                    Bariatric Surgery - Follow-U  
karthikeyan 03-   
   
                                                    Bariatric Surgery -   
Follow-Up                               Chief Complaint  
The patient is being seen   
for post operative visit.  
The patient is being seen   
today for a 3 month   
post-op visit. Type of   
surgery: London-y Gastric   
Bypass.  
  
History of Present   
Illness  
Type of Surgery: lap   
london-en-y gastric bypass.  
Weight:.  
Initial weight: 281 lbs.  
Last visit weight: 237   
lbs.  
Current weight: 228.7   
lbs.  
Total weight lost: 53   
lbs.  
Food: Breakfast: Egg with   
cheese and peppers,   
Snack: Cottage cheese in   
pairs, Lunch: Cheese and   
raisins, Dinner: Steak   
peppers with cheese, does   
not drink carbonated   
beverage  
Time to eat meals: 20   
minutes  
Fluid intake: 64 oz.  
Diet Stage: regular food.  
Exercise frequency:   
patient exercises daily.  
Exercise includes aerobic   
conditioning. The patient   
exercises for 90 minutes   
per week.  
Symptoms: The patient   
reports loss of appetite,   
but no hunger, no nausea,   
no vomiting, no food   
intolerance, no   
constipation, no   
diarrhea, no dumping   
syndrome and no abdominal   
pain.  
Supplements: taking   
multivitamins, but not   
taking B-12, not taking   
calcium, not taking iron   
and not taking   
Omeprazole.  
39-year-old female with   
history of London-en-Y   
gastric bypass 3 months   
ago. Patient's doing well   
she has no complaints.   
She's not having any   
pain. She is moving her   
bowels regularly. She's   
not having any nausea or   
vomiting.  
There are no   
spiritual/cultural   
practices/values/needs   
that are important to   
know  
Initial Fall Risk   
Screening:  
SHARON has not fallen   
in the last 6 months.   
SHARON does not have a   
fear of falling. She does   
not need assistance with   
sitting, standing or   
walking. Does not need   
assistance walking in her   
home. She does not need   
assistance in an   
unfamiliar setting. The   
patient is not using an   
assistive device.  
  
Review of Systems  
Negative other than   
history of present   
illness  
  
Active Problems  
AMA (advanced maternal   
age) multigravida 35+   
(659.63) (O09.529)  
Chronic daily headache   
(784.0) (R51)  
Common migraine without   
aura (346.10) (G43.009)  
Disorder of iron   
metabolism, unspecified   
(275.09) (E83.10)  
Encounter for   
pre-bariatric surgery   
counseling and education   
(V65.49) (Z71.89)  
Reviewed recent labs  
Paperwork completed  
Encounter for special   
screening examination for   
nutritional disorder   
(V77.99) (Z13.21)  
Excessive daytime   
sleepiness (780.54)   
(G47.19)  
Foot injury, left,   
initial encounter (959.7)   
(S99.922A)  
GERD (gastroesophageal   
reflux disease) (530.81)   
(K21.9)  
Gestational diabetes   
mellitus (648.80)   
(O24.419)  
Left ankle sprain   
(845.00) (S93.402A)  
Migraine (346.90)   
(G43.909)  
Has been quiescent for   
now  
Morbid obesity (278.01)   
(E66.01)  
We will run baseline   
blood tests  
Referral to bariatric   
surgery after review of   
test results  
Morbid obesity with BMI   
of 45.0-49.9, adult   
(278.01,V85.42)   
(E66.01,Z68.42)  
KATHY (obstructive sleep   
apnea) (327.23) (G47.33)  
Post-op pain (338.18)   
(G89.18)  
Post-operative nausea and   
vomiting (787.01)   
(R11.2,Z98.890)  
Pre-bariatric surgery   
nutrition evaluation   
(V65.3) (Z71.3)  
S/P gastric bypass   
(V45.86) (Z98.84)  
Screening for diabetes   
mellitus (V77.1) (Z13.1)  
A1c  
Sprain of foot, left,   
initial encounter   
(845.10) (S93.602A)  
Vitamin D deficiency   
(268.9) (E55.9)  
Status post treatment  
Vitamin D level ordered  
Past Medical History  
History of anxiety   
(V11.8) (Z86.59)  
History of vitamin D   
deficiency (V12.1)   
(Z86.39)  
History of Migraine   
headache (346.90)   
(G43.909)  
Surgical History  
History of Dilation And   
Curettage  
History of London-en-Y   
gastric bypass  
Family History  
Family history of   
diabetes mellitus (V18.0)   
(Z83.3)  
Family history of   
cerebrovascular accident   
(CVA) (V17.1) (Z82.3)  
Social History  
Former smoker (V15.82)   
(Z87.891)  
No alcohol use  
No caffeine use  
Non-smoker (V49.89)   
(Z78.9)  
Allergies  
sulfa  
Recorded By: Karen Day; 10/7/2014   
11:07:12 AM  
Current Meds  
Iron 325 (65 Fe) MG Oral   
Tablet; TAKE 1 TABLET   
Twice daily 2 hrs   
separate from  
calcium, take with MVI;  
Therapy: 89Hul1808 to   
(Evaluate:93Keb8034)   
Requested for: 35Obi2225;   
Last  
Rx:63Suw0784 Ordered  
Rx By: Leydi Luong;   
Dispense: 30 Days ; #:60   
Tablet; Refill: 2;For:   
Disorder of iron   
metabolism, unspecified;   
BRYON = N; Verified   
Transmission to [facility] #[id number]  
Vitamin C Oral Tablet   
Chewable; TAKE 1 TABLET 3   
times daily Take 1 tablet   
with your  
Iron each time;  
Therapy: 63Uhx5240 to   
(Evaluate:09Kdd4268)   
Requested for: 62Icf7712;   
Last  
Rx:97Ynr7133 Ordered  
Rx By: Leydi Luong;   
Dispense: 30 Days ; #:90   
Tablet; Refill: 5;For:   
Disorder of iron   
metabolism, unspecified;   
BRYON = N; Verified   
Transmission to [facility] #[id number]; Msg to   
Pharmacy: 500mg dose   
chewables  
Omeprazole 40 MG Oral   
Capsule Delayed Release;   
TAKE 1 CAPSULE Daily;  
Therapy: 01Zel2653 to   
(Last Rx:03Jhw8251)   
Requested for: 48Ngc3324   
Ordered  
Rx By: Leydi Luong;   
Dispense: 0 Days ; #:30   
Capsule; Refill: 2;For:   
GERD (gastroesophageal   
reflux disease); BRYON = N;   
Verified Transmission to   
[facility]   
#[id number]  
Omeprazole 40 MG Oral   
Capsule Delayed Release;   
TAKE 1 CAPSULE Daily Open  
capsule, sprinkle in SF   
applesauce or pudding,   
swallow. DO NOT CHEW;  
Therapy: 10Gki2679 to   
(Last Rx:84Jbf6095)   
Requested for: 97Edp1015   
Ordered  
Rx By: Leydi Luong;   
Dispense: 0 Days ; #:30   
Capsule; Refill: 2;For:   
GERD (gastroesophageal   
reflux disease),   
Post-operative nausea and   
vomiting; BRYON = N;   
Verified Transmission to   
[facility]   
#[id number]  
Gabapentin 100 MG Oral   
Capsule; Take 3 capsules   
at bedtime starting 2   
days before  
surgery. Take 1 capsule 3   
times daily after   
surgery, open capsule,   
take with SF pudding;  
Therapy: 94Qfu7879 to   
(Last Rx:32Tvf8142)   
Requested for: 85Ila4823   
Ordered  
Rx By: Leydi Luong;   
Dispense: 0 Days ; #:27   
Capsule; Refill: 0;For:   
Post-operative nausea and   
vomiting; BRYON = N;   
Verified Transmission to   
[facility]   
#[id number]  
Ondansetron 4 MG Oral   
Tablet Disintegrating;   
take 1-2 tablets every 8   
hours as needed  
for nausea;  
Therapy: 40Sjn1050 to   
(Last Rx:96Wkz7767)   
Requested for: 06Arb0370   
Ordered  
Rx By: Leydi Luong;   
Dispense: 0 Days ; #:30   
Tablet; Refill: 2;For:   
Post-operative nausea and   
vomiting; BRYON = N;   
Verified Transmission to   
[facility]   
#[id number]  
oxyCODONE HCl - 5 MG/5ML   
Oral Solution; TAKE 5 ML   
Every 4 hours PRN pain  
alternate with tylenol   
and heating pad;  
Therapy: 26Tfe7166 to   
(Evaluate:00Vfp0635);   
Last Rx:94Wgk6178 Ordered  
Rx By: Leydi Luong;   
Dispense: 6 Days ; #:180   
Milliliter; Refill:   
0;For: Post-operative   
nausea and vomiting; BRYON   
= N; Print Rx  
Ibuprofen 200 MG Oral   
Tablet; 5 tablets daily;  
Therapy:   
(Recorded:2016) to   
Recorded  
Dispense: 0 Days ; #:   
Sufficient Tablet;   
Refill: 0; BRYON = N;   
Record; Last Updated By:   
Mariana Tipton;   
2016 10:10:18 AM  
Vitals  
Vital Signs  
Recorded: 2020   
09:32AM  
Heart Rate71  
Ptcgfhuk350  
Jfpuqpoxs57  
Height5 ft 5 in  
Pakdeh213 lb  
BMI Znxhoozksl21.94  
BSA Calculated2.09  
Physical Exam  
Eyes: Conjunctiva non   
-icteric and eye lids are   
without obvious rash or   
drooping. Pupils are   
symmetric.  
Ears, Nose, Mouth, and   
Throat: External ears and   
nose appear to be without   
deformity or rash. No   
lesions or masses noted.   
Hearing is grossly   
intact.  
Neck:. No JVD noted,   
tracheal position is   
midline. No thyromegaly,   
no thyroid nodules  
Head and Face:   
Examination of the head   
and face revealed no   
abnormalities.  
Respiratory: No gasping   
or shortness of breath   
noted, no use of   
accessory muscles noted.   
Clear to auscultate   
bilaterally  
Cardiovascular:   
Examination for edema is   
normal. Regular rate and   
rhythm S1 S2 without   
murmurs  
GI: Abdomen nontender to   
palpation. Soft nontender   
bowel sounds present no   
hepatosplenomegaly,   
incisions are well-healed   
well approximated   
nontender no hernias are   
present  
Skin: No rashes or open   
lesions/ulcers identified   
on skin.  
Musk: Digits/nails show   
no clubbing or cyanosis.   
No asymmetry or masses   
noted of the musculature.   
Examination of the   
muscles/joints/bones show   
normal range of motion.   
Gait is grossly normally.  
Neurologic: Cranial   
nerves II- XII intact,   
motor strength 5/5 muscle   
strength of the lower   
extremities bilaterally   
and equal.  
  
Diagnoses/Problems  
S/P gastric bypass   
(V45.86) (Z98.84)  
Orders  
S/P gastric bypass  
Complete Blood Count +   
Differential; Specimen   
Source:Blood (BLD);   
Status:Active;  
Requested for:2020;  
Perform:Lab Services -   
Lab To Draw (Blood Test);   
Due:2020;Ordered;   
For:S/P gastric bypass;   
Ordered By:Jana Barrios;  
Comprehensive Metabolic   
Panel; Status:Active;   
Requested for:2020;  
Perform:Lab Services -   
Lab To Draw (Blood Test);   
Due:2020;Ordered;   
For:S/P gastric bypass;   
Ordered By:Jana Barrios;  
Copper, Serum; Specimen   
Source:Blood (BLD);   
Status:Active; Requested  
for:2020;  
Perform:Lab Services -   
Lab To Draw (Blood Test);   
Due:2020;Ordered;   
For:S/P gastric bypass;   
Ordered By:Jana Barrios;  
Ferritin, Serum; Specimen   
Source:Blood (BLD);   
Status:Active; Requested   
for:2020;  
Perform:Lab Services -   
Lab To Draw (Blood Test);   
Due:2020;Ordered;   
For:S/P gastric bypass;   
Ordered By:Jana Barrios;  
Folate, Serum; Specimen   
Source:Blood (BLD);   
Status:Active; Requested   
for:2020;  
Perform:Lab Services -   
Lab To Draw (Blood Test);   
Due:2020;Ordered;   
For:S/P gastric bypass;   
Ordered By:Jana Barrios;  
Iron + TIBC, Serum;   
Specimen Source:Blood   
(BLD); Status:Active;   
Requested  
for:2020;  
Perform:Lab Services -   
Lab To Draw (Blood Test);   
Due:2020;Ordered;   
For:S/P gastric bypass;   
Ordered By:Jana Barrios;  
Magnesium, Serum;   
Specimen Source:Blood   
(BLD); Status:Active;   
Requested  
for:2020;  
Perform:Lab Services -   
Lab To Draw (Blood Test);   
Due:2020;Ordered;   
For:S/P gastric bypass;   
Ordered By:Jana Barrios;  
Parathormone Intact,   
Serum; Specimen   
Source:Blood (BLD);   
Status:Active; Requested  
for:2020;  
Perform:Lab Services -   
Lab To Draw (Blood Test);   
Due:2020;Ordered;   
For:S/P gastric bypass;   
Ordered By:Jana Barrios;  
TSH WITH REFLEX TO FREE   
T4 IF ABNORMAL; Specimen   
Source:Blood (BLD);  
Status:Active; Requested   
for:2020;  
Perform:Lab Services -   
Lab To Draw (Blood Test);   
Due:2020;Ordered;   
For:S/P gastric bypass;   
Ordered By:Jana Barrios;  
Vitamin A, Serum;   
Specimen Source:Blood   
(BLD); Status:Active;   
Requested  
for:2020;  
Perform:Lab Services -   
Lab To Draw (Blood Test);   
Due:2020;Ordered;   
For:S/P gastric bypass;   
Ordered By:Jana Barrios;  
Vitamin B1 - Thiamine,   
Whole Blood; Specimen   
Source:Whole Blood;   
Status:Active;  
Requested for:2020;  
Perform:Lab Services -   
Lab To Draw (Blood Test);   
Due:2020;Ordered;   
For:S/P gastric bypass;   
Ordered By:Jana Barrios;  
Vitamin B12, Serum;   
Specimen Source:Blood   
(BLD); Status:Active;   
Requested  
for:2020;  
Perform:Lab Services -   
Lab To Draw (Blood Test);   
Due:2020;Ordered;   
For:S/P gastric bypass;   
Ordered By:Jana Barrios;  
Vitamin D 25-Hydroxy;   
Specimen Source:Blood   
(BLD); Status:Active;   
Requested  
for:2020;  
Perform:Lab Services -   
Lab To Draw (Blood Test);   
Due:2020;Ordered;   
For:S/P gastric bypass;   
Ordered By:Jana Barrios;  
Zinc, Serum; Specimen   
Source:Blood (BLD);   
Status:Active; Requested   
for:2020;  
Perform:Lab Services -   
Lab To Draw (Blood Test);   
Due:2020;Ordered;   
For:S/P gastric bypass;   
Ordered By:Jana Barrios;  
Provider Impressions  
39-year-old female 3   
months status post   
London-en-Y gastric bypass.   
Patient's doing well she   
does not have any   
complaints.  
Get your B12 and calcium   
and restart taking it  
Get 3 month laboratory   
work  
Increase exercise  
Follow-up 3 months  
  
Patient   
Discussion/Summary  
3 Month P/O Visit  
You are 3 months s/p   
VSG/RYGBP.  
Please have your 3 month   
lab work completed today,   
we will call with any   
abnormal results.  
Be sure to take your   
multivitamins, your B12   
and your calcium daily.  
Continue to work on   
exercise, increase the   
intensity and variety of   
your exercise routine,   
and aim for 300 mins a   
week total.  
Come to support groups.  
Follow-up with the   
Dietician as needed.  
Follow-up in 3 months for   
your 6 month visit.  
  
  
Signatures  
Electronically signed by   
: Jana Barrios PA-C;   
Mar 10 2020 9:34AM EST   
(Author)            Normal                                     
[employer]  
   
                                                    CBC AND DIFFERENTIALon 03-10  
-2020   
   
                                                    % AUTOMATED IMMATURE   
GRAN            0.3 %           Normal          0.0 - 0.9       [facility]  
   
                                        Comment on above:   Result Comment: Ivonne  
ture Granulocyte Count (IG) includes   
promyelocytes,  
myelocytes and metamyelocytes but does not include bands.  
Percent differential counts (%) should be interpreted in the  
context of the absolute cell counts (cells/L).   
   
                                                            Performed By: #### U  
AMIC ####  
[address] [address]   
   
                                                    Basophils (Bld)   
[#/Vol]         0.02 10*3/uL    Normal          0.00 - 0.10     [facility]  
   
                                        Comment on above:   Performed By: #### U  
AMIC ####  
[address] [address]   
   
                                                    Basophils/100 WBC   
(Bld)           0.5 %           Normal          0.0 - 2.0       [facility]  
   
                                        Comment on above:   Performed By: #### U  
AMIC ####  
[address] [address]   
   
                                                    Eosinophils (Bld)   
[#/Vol]         0.10 10*3/uL    Normal          0.00 - 0.70     [facility]  
   
                                        Comment on above:   Performed By: #### U  
AMIC ####  
[address] [address]   
   
                                                    Eosinophils/100 WBC   
(Bld)           2.6 %           Normal          0.0 - 6.0       [facility]  
   
                                        Comment on above:   Performed By: #### U  
AMIC ####  
[address] [address]   
   
                                                    Erythrocyte   
distribution width   
(RBC) [Ratio]   13.2 %          Normal          11.5 - 14.5     [facility]  
   
                                        Comment on above:   Performed By: #### U  
AMIC ####  
[address] [address]   
   
                                                    Hematocrit (Bld)   
[Volume fraction] 38.2 %          Normal          36.0 - 46.0     [facility]  
   
                                        Comment on above:   Performed By: #### U  
AMIC ####  
[address] [address]   
   
                                                    Hemoglobin (Bld)   
[Mass/Vol]      12.5 g/dL       Normal          12.0 - 16.0     [facility]  
   
                                        Comment on above:   Performed By: #### U  
AMIC ####  
[address] [address]   
   
                                                    Lymphocytes (Bld)   
[#/Vol]         1.42 10*3/uL    Normal          1.20 - 4.80     [facility]  
   
                                        Comment on above:   Performed By: #### U  
AMIC ####  
[facility]  
[address] [place] [address]   
   
                                                    Lymphocytes/100 WBC   
(Bld)           36.2 %          Normal          13.0 - 44.0     [facility]  
   
                                        Comment on above:   Performed By: #### U  
AMIC ####  
[address] [place] [address]   
   
                      MCHC (RBC) [Mass/Vol] 32.7 g/dL  Normal     32.0 - 36.0 [facility]  
   
                                        Comment on above:   Performed By: #### U  
AMIC ####  
[address] [place] [address]   
   
                                                    MCV (RBC) [Entitic   
vol]            86 fL           Normal          80 - 100        [facility]  
   
                                        Comment on above:   Performed By: #### U  
AMIC ####  
[address] [address]   
   
                                                    Monocytes (Bld)   
[#/Vol]         0.30 10*3/uL    Normal          0.10 - 1.00     [facility]  
   
                                        Comment on above:   Performed By: #### U  
AMIC ####  
[address] [place] [address]   
   
                                                    Monocytes/100 WBC   
(Bld)           7.7 %           Normal          2.0 - 10.0      [facility]  
   
                                        Comment on above:   Performed By: #### U  
AMIC ####  
[address] [place] [address]   
   
                                                    Neutrophils (Bld)   
[#/Vol]         2.07 10*3/uL    Normal          1.20 - 7.70     [facility] [facility]  
   
                                        Comment on above:   Performed By: #### U  
AMIC ####  
[address] [place] [address]   
   
                                                    Neutrophils/100 WBC   
(Bld)           52.7 %          Normal          40.0 - 80.0     [facility]  
   
                                        Comment on above:   Performed By: #### U  
AMIC ####  
[address] [place] [address]   
   
                                                    Nucleated RBC/100 WBC   
(Bld) [Ratio]   0.0 /100 WBC    Normal          0.0 - 0.0       [facility]  
   
                                        Comment on above:   Performed By: #### U  
AMIC ####  
[facility]  
[address] [place]  
[place] [address]   
   
                                                    Platelets (Bld)   
[#/Vol]         276 10*3/uL     Normal          150 - 450       [facility]  
   
                                        Comment on above:   Performed By: #### U  
AMIC ####  
[facility]  
[address]  
[place] [address]   
   
                      RBC (Bld) [#/Vol] 4.45 x10E12/L Normal     4.00 - 5.20 [facility]  
   
                                        Comment on above:   Performed By: #### U  
AMIC ####  
[facility]  
[address] [facility] [place] [address]   
   
                      WBC (Bld) [#/Vol] 3.9 10*3/uL Low        4.4 - 11.3 [facility]  
   
                                        Comment on above:   Performed By: #### U  
AMIC ####  
[facility]  
[address]  
[place] [address]   
   
                                                    COMPREHENSIVE PANELon 03-10-  
2020   
   
                      Albumin [Mass/Vol] 4.1 g/dL   Normal     3.4 - 5.0  [facility]  
   
                                        Comment on above:   Performed By: #### U  
AMIC ####  
[facility]  
[address] [facility] [place] [address]   
   
                                                    ALP [Catalytic   
activity/Vol]   56 U/L          Normal          33 - 110        [facility]  
   
                                        Comment on above:   Performed By: #### U  
AMIC ####  
[facility]  
[address]  
[place] [address]   
   
                                                    ALT [Catalytic   
activity/Vol]   15 U/L          Normal          7 - 45          [facility]  
   
                                        Comment on above:   Result Comment: Hillary  
ents treated with Sulfasalazine may   
generate  
falsely decreased results for ALT.   
   
                                                            Performed By: #### U  
AMIC ####  
[facility]  
[address]  
[place] [address]   
   
                      Anion gap [Moles/Vol] 10 mmol/L  Normal     10 - 20    [facility]  
   
                                        Comment on above:   Performed By: #### U  
AMIC ####  
[facility]  
[address] [place]  
[place] [address]   
   
                                                    AST [Catalytic   
activity/Vol]   14 U/L          Normal          9 - 39          [facility]  
   
                                        Comment on above:   Performed By: #### U  
AMIC ####  
[address] [address]   
   
                      Bilirubin [Mass/Vol] 0.4 mg/dL  Normal     0.0 - 1.2  [facility]  
   
                                        Comment on above:   Performed By: #### U  
AMIC ####  
[address] [address]   
   
                      Calcium [Mass/Vol] 9.5 mg/dL  Normal     8.6 - 10.3 [facility]  
   
                                        Comment on above:   Performed By: #### U  
AMIC ####  
[address] [address]   
   
                      Chloride [Moles/Vol] 105 mmol/L Normal     98 - 107   [facility]  
   
                                        Comment on above:   Performed By: #### U  
AMIC ####  
[address] [address]   
   
                      Creatinine [Mass/Vol] 0.60 mg/dL Normal     0.50 - 1.05 [facility]  
   
                                        Comment on above:   Performed By: #### U  
AMIC ####  
[address] [address]   
   
                      GFR- AM. >60        Normal     >60        [facility]  
   
                                        Comment on above:   Result Comment: CALC  
ULATIONS OF ESTIMATED GFR ARE PERFORMED  
USING THE MDRD STUDY EQUATION FOR THE  
IDMS-TRACEABLE CREATININE METHODS.  
CLIN CHEM 2007;53:766-72   
   
                                                            Performed By: #### U  
AMIC ####  
[address] [address]   
   
                      GFR-NON  AM. >60        Normal     >60        [facility]  
   
                                        Comment on above:   Performed By: #### U  
AMIC ####  
[address] [address]   
   
                      Glucose [Mass/Vol] 91 mg/dL   Normal     74 - 99    [facility]  
   
                                        Comment on above:   Performed By: #### U  
AMIC ####  
[address] [address]   
   
                                                    HCO3 (Bld)   
[Moles/Vol]     28 mmol/L       Normal          21 - 32         [facility]  
   
                                        Comment on above:   Performed By: #### U  
AMIC ####  
[address] [address]   
   
                      Potassium [Moles/Vol] 4.4 mmol/L Normal     3.5 - 5.3  [facility]  
   
                                        Comment on above:   Performed By: #### U  
AMIC ####  
[facility]  
[address] [place] [address]   
   
                      Protein [Mass/Vol] 6.8 g/dL   Normal     6.4 - 8.2  [facility]  
   
                                        Comment on above:   Performed By: #### U  
AMIC ####  
[facility]  
[address] [place] [address]   
   
                      Sodium [Moles/Vol] 139 mmol/L Normal     136 - 145  [facility]  
   
                                        Comment on above:   Performed By: #### U  
AMIC ####  
[facility]  
[address] [place] [address]   
   
                                                    Urea nitrogen   
[Mass/Vol]      19 mg/dL        Normal          6 - 23          [facility]  
   
                                        Comment on above:   Performed By: #### U  
AMIC ####  
[facility]  
[address] [place] [address]   
   
                                                    Dietition Noteon 03-   
   
                                        Dietition Note      Chief Complaint  
  
obesity  
3 month post op nutrition   
follow up  
  
Active Problems  
AMA (advanced maternal   
age) multigravida 35+   
(659.63) (O09.529)  
Chronic daily headache   
(784.0) (R51)  
Common migraine without   
aura (346.10) (G43.009)  
Disorder of iron   
metabolism, unspecified   
(275.09) (E83.10)  
Encounter for   
pre-bariatric surgery   
counseling and education   
(V65.49) (Z71.89)  
Reviewed recent labs  
Paperwork completed  
Encounter for special   
screening examination for   
nutritional disorder   
(V77.99) (Z13.21)  
Excessive daytime   
sleepiness (780.54)   
(G47.19)  
Foot injury, left,   
initial encounter (959.7)   
(S99.922A)  
GERD (gastroesophageal   
reflux disease) (530.81)   
(K21.9)  
Gestational diabetes   
mellitus (648.80)   
(O24.419)  
Left ankle sprain   
(845.00) (S93.402A)  
Migraine (346.90)   
(G43.909)  
Has been quiescent for   
now  
Morbid obesity (278.01)   
(E66.01)  
We will run baseline   
blood tests  
Referral to bariatric   
surgery after review of   
test results  
Morbid obesity with BMI   
of 45.0-49.9, adult   
(278.01,V85.42)   
(E66.01,Z68.42)  
KATHY (obstructive sleep   
apnea) (327.23) (G47.33)  
Post-op pain (338.18)   
(G89.18)  
Post-operative nausea and   
vomiting (787.01)   
(R11.2,Z98.890)  
Pre-bariatric surgery   
nutrition evaluation   
(V65.3) (Z71.3)  
S/P gastric bypass   
(V45.86) (Z98.84)  
Screening for diabetes   
mellitus (V77.1) (Z13.1)  
A1c  
Sprain of foot, left,   
initial encounter   
(845.10) (S93.602A)  
Vitamin D deficiency   
(268.9) (E55.9)  
Status post treatment  
Vitamin D level ordered  
Past Medical History  
History of anxiety   
(V11.8) (Z86.59)  
History of vitamin D   
deficiency (V12.1)   
(Z86.39)  
History of Migraine   
headache (346.90)   
(G43.909)  
Surgical History  
History of Dilation And   
Curettage  
History of [place]-en-Y   
gastric bypass  
Family History  
Family history of   
diabetes mellitus (V18.0)   
(Z83.3)  
Family history of   
cerebrovascular accident   
(CVA) (V17.1) (Z82.3)  
Social History  
Former smoker (V15.82)   
(Z87.891)  
No alcohol use  
No caffeine use  
Non-smoker (V49.89)   
(Z78.9)  
Allergies  
sulfa  
Recorded By: Karen Day; 10/7/2014   
11:07:12 AM  
Current Meds  
Iron 325 (65 Fe) MG Oral   
Tablet; TAKE 1 TABLET   
Twice daily 2 hrs   
separate from  
calcium, take with MVI;  
Therapy: 80Pqt8293 to   
(Evaluate:01Pju6969)   
Requested for: 27Wla9025;   
Last  
Rx:[id number] Ordered  
Rx By: Leydi Luong;   
Dispense: 30 Days ; #:60   
Tablet; Refill: 2;For:   
Disorder of iron   
metabolism, unspecified;   
BRYON = N; Verified   
Transmission to [facility] [address]  
Vitamin C Oral Tablet   
Chewable; TAKE 1 TABLET 3   
times daily Take 1 tablet   
with your  
Iron each time;  
Therapy: 77Xxc4605 to   
(Evaluate:55Asn5795)   
Requested for: 24Lkc5335;   
Last  
Rx:[id number] Ordered  
Rx By: Leydi Luong;   
Dispense: 30 Days ; #:90   
Tablet; Refill: 5;For:   
Disorder of iron   
metabolism, unspecified;   
BRYON = N; Verified   
Transmission to [facility] [address]; Msg to   
Pharmacy: 500mg dose   
chewables  
Omeprazole 40 MG Oral   
Capsule Delayed Release;   
TAKE 1 CAPSULE Daily;  
Therapy: 29Cee3409 to   
(Last Rx:20Gtl4867)   
Requested for: 93Bam9527   
Ordered  
Rx By: Leydi Luong;   
Dispense: 0 Days ; #:30   
Capsule; Refill: 2;For:   
GERD (gastroesophageal   
reflux disease); BRYON = N;   
Verified Transmission to   
[facility]   
[address]  
Omeprazole 40 MG Oral   
Capsule Delayed Release;   
TAKE 1 CAPSULE Daily Open  
capsule, sprinkle in SF   
applesauce or pudding,   
swallow. DO NOT CHEW;  
Therapy: 40Wfu7297 to   
(Last Rx:78Ahh1467)   
Requested for: 42Gsq3783   
Ordered  
Rx By: Leydi Luong;   
Dispense: 0 Days ; #:30   
Capsule; Refill: 2;For:   
GERD (gastroesophageal   
reflux disease),   
Post-operative nausea and   
vomiting; BRYON = N;   
Verified Transmission to   
[facility]   
[address]  
Gabapentin 100 MG Oral   
Capsule; Take 3 capsules   
at bedtime starting 2   
days before  
surgery. Take 1 capsule 3   
times daily after   
surgery, open capsule,   
take with SF pudding;  
Therapy: 97Vvq8226 to   
(Last Rx:22Euk2511)   
Requested for: 81Kva2555   
Ordered  
Rx By: Leydi Luong;   
Dispense: 0 Days ; #:27   
Capsule; Refill: 0;For:   
Post-operative nausea and   
vomiting; BRYON = N;   
Verified Transmission to   
[facility]   
[address]  
Ondansetron 4 MG Oral   
Tablet Disintegrating;   
take 1-2 tablets every 8   
hours as needed  
for nausea;  
Therapy: 67Fnm3615 to   
(Last Rx:91Yhh4182)   
Requested for: 24Hws2502   
Ordered  
Rx By: Leydi Luong;   
Dispense: 0 Days ; #:30   
Tablet; Refill: 2;For:   
Post-operative nausea and   
vomiting; BRYON = N;   
Verified Transmission to   
[facility]   
[address]  
oxyCODONE HCl - 5 MG/5ML   
Oral Solution; TAKE 5 ML   
Every 4 hours PRN pain  
alternate with tylenol   
and heating pad;  
Therapy: 2019 to   
(Evaluate:04Giu8332);   
Last Rx:[id number] Ordered  
Rx By: Leydi Luong;   
Dispense: 6 Days ; #:180   
Milliliter; Refill:   
0;For: Post-operative   
nausea and vomiting; BRYON   
= N; Print Rx  
Ibuprofen 200 MG Oral   
Tablet; 5 tablets daily;  
Therapy:   
(Recorded:2016) to   
Recorded  
Dispense: 0 Days ; #:   
Sufficient Tablet;   
Refill: 0; BRYON = N;   
Record; Last Updated By:   
Mariana Tipton;   
2016 10:10:18 AM  
Provider Impressions  
NAME: Sharon Fischer   
DATE: 3.10.20 : 7.1  
Surgery Date: 19  
Surgeon: Bethany  
Procedure: gastric bypass  
ASSESSMENT:  
Current weight: 228.7 Ht:   
65.0 in. BMI: 40.5  
Previous weight: 237.8   
1.  
Initial start weight:   
277.4 1.7.19  
EBW: 131.2  
Total weight change: 48.7  
%EBW Lost: 37.1%  
PROGRESS:  
Nutrition Interventions   
for last encounter (date   
20):  
1.You need to start   
working on eating food to   
meet your protein goal.   
Eat 5-6x/day and choose   
high protein foods at   
each and snack. If you   
cant meet your goal,   
drink a shake.  
2.Continue to drink 64   
oz. of zero calorie   
beverages per day  
3.Continue no drinking 30   
min before, during the   
meal and for 30 minutes   
after the meal  
4.Start doing something   
for exercise. Increase   
intensity and duration of   
exercise  
5.Advance to the   
transition diet. You may   
try raw veggies, fresh   
fruit and lean ground   
beef.  
6.Eat slowly, chew   
thoroughly  
7.Try one new food at a   
time.  
8.Continue current   
vit/min regimen. You are   
not taking enough   
calcium. You need to take   
1 tbsp., 3x/day for a   
total of 3 tbsp. that   
will provide 1500 mg of   
calcium.  
CHANGES IN TREATMENT:  
Patient met goals:   
Partially  
Actions to meet previous   
goals:  
1.You need to start   
working on eating food to   
meet your protein goal.   
Eat 5-6x/day and choose   
high protein foods at   
each and snack.  
2.You need to take 2 MVI,   
7479-2463 mg of calcium   
and 500 mcg of B12 daily.  
24 hour food recall: Pt   
on a regular diet;   
consumes <60 g protein   
and 64 oz of fluid daily  
Breakfast: 1 egg w/cheese   
onions, peppers, 1 slice   
pork owen  
Snack: none  
Lunch: 2 oz cheese and   
handful raisins  
Snack: none  
Dinner: 3 oz steak   
w/peppers, onions, cheese   
and sour cream  
Snack: c cottage cheese,   
pears  
Beverages: water, Green   
smoothie 150 sangeeta  
Alcohol: no  
Vitamins: 1 MVI, no   
calcium, and no B12  
Medications: see list  
Physical Activity: You   
Tube videos for 20 min   
7x/week  
READINESS TO LEARN:  
Motivation to learn:   
Interested  
Understanding of   
instruction: Good  
Anticipated Compliance:   
Good  
Family Support: Unable to   
assess-family not present  
Patient presents with   
post-op weight loss   
surgery gastric bypass.   
Patient has lost 48.7   
pounds since initial   
assessment accounting for   
37.1% loss excess body   
weight. Tolerating a   
regular diet without   
difficulty. Protein   
intake is not adequate   
for post-op individual.   
Fluid consumption is   
adequate. Patient is not   
supplementing recommended   
vitamin/minerals.   
Reminded pt that she   
needs to take all   
vitamins and minerals   
daily. Discussed   
consequences of   
deficiencies. Gave pt   
handout with post op   
vit/min supplementation   
requirements.  
Review of intake shows   
that pt is drinking high   
calorie beverages, eating   
pork owen, and not   
meeting protein goal.   
Reminded pt, again, to   
choose foods that are   
high in protein and lower   
in calories. Advised to   
switch to turkey owen or   
sausage. Recommend eating   
5-6x/day. Advised to stop   
drinking the green   
smoothie and any other   
beverages that have   
calories. Advised to   
start measuring all   
portions that she is   
eating record daily to be   
sure she is meting her   
protein goal.  
Malnutrition Screening  
Significant unintentional   
weight loss? n/a  
Eating less than 75% of   
usual intake for more   
than 2 weeks? n/a  
Nutrition Diagnosis:  
1.Increased protein and   
nutrition needs related   
to altered GI function as   
evidenced by pt. s/p   
gastric bypass.  
2.Food- and   
nutrition-related   
knowledge deficit related   
to lack of prior exposure   
to surgical weight loss   
information as evidenced   
by diet recall.  
3.Inadequate vitamin and   
mineral intake related to   
pt not taking required   
vitamins and minerals as   
evidenced by pt   
statements.  
4.Undesirable food   
choices related to   
unwillingness to select   
food consistent with   
guidelines as evidenced   
by diet recall.  
Nutrition Interventions:  
1.Modify type and amount   
of food and nutrients   
within meals and snacks.  
2.Comprehensive Nutrition   
Education  
-Nutrition education   
materials: Post op   
Vitamin/Mineral handout  
Recommendations:  
1.You need to start   
working on eating food to   
meet your protein goal.   
Eat 5-6x/day and choose   
high protein foods at   
each and snack. Measure   
all portions that you eat   
and record intake daily   
to be sure you are   
meeting your protein   
goal.  
2.Continue to drink 64   
oz. of zero calorie   
beverages per day  
3.Stop drinking anything   
with calories especially   
the green smoothie.  
4.Continue no drinking 30   
min before, during the   
meal and for 30 minutes   
after the meal  
5.Increase intensity and   
duration of exercise  
6.You need to take 2 MVI,   
1352-3754 mg of calcium   
and 500 mcg of B12 daily.  
Nutrition Monitoring and   
Evaluation:  
1-2 pounds weight loss   
per week  
Criteria: weight check,   
food recall  
Need for Follow-up: 6   
month post op  
Caitlyn MONTILLA, [facility]  
Bariatric Surgery   
Dietitian  
Phone: [phone]  
Fax: [phone]  
  
Patient   
Discussion/Summary  
Recommendations:  
1.You need to start   
working on eating food to   
meet your protein goal.   
Eat 5-6x/day and choose   
high protein foods at   
each and snack. Measure   
all portions that you eat   
and record intake daily   
to be sure you are   
meeting your protein   
goal.  
2.Continue to drink 64   
oz. of zero calorie   
beverages per day  
3.Stop drinking anything   
with calories especially   
the green smoothie.  
4.Continue no drinking 30   
min before, during the   
meal and for 30 minutes   
after the meal  
5.Increase intensity and   
duration of exercise  
6.You need to take 2 MVI,   
5172-6170 mg of calcium   
and 500 mcg of B12 daily.  
Nutrition Monitoring and   
Evaluation:  
1-2 pounds weight loss   
per week  
Criteria: weight check,   
food recall  
Need for Follow-up: 6   
month post op  
Caitlyn MONTILLA, [facility]  
Bariatric Surgery   
Dietitian  
Phone: [phone]  
Fax: [phone]  
  
  
Signatures  
Electronically signed by   
: Caitlyn Betts RD; Mar 10   
2020 10:58AM EST (Author) Normal                                     
[facility]  
   
                                                    FERRITINon 03-   
   
                      Ferritin [Mass/Vol] 26 ug/L    Normal     8 - 150    [facility]  
   
                                        Comment on above:   Performed By: #### P  
TINR ####  
[address] [address]   
   
                                                    FOLATE, SERUMon 03-   
   
                      Folate [Mass/Vol] 8.0 ng/mL  Normal     >5.0       [facility]  
   
                                        Comment on above:   Result Comment: Low   
<3.4  
Borderline 3.4-5.0  
Normal >5.0  
.  
Biotin interference may cause falsely elevated results.  
Patients taking a Biotin dose of up to 5 mg/day should  
refrain from taking Biotin for 24 hours before sample  
collection. Providers may contact their local laboratory  
for further information.   
   
                                                            Performed By: #### P  
TINR ####  
[address] [address]   
   
                                                    IRON + TIBCon 03-   
   
                      % SATURATION 16 %       Low        25 - 45    [facility]  
   
                                        Comment on above:   Performed By: #### U  
AMIC ####  
[address] [address]   
   
                      Iron [Mass/Vol] 56 ug/dL   Normal     35 - 150   [facility]  
   
                                        Comment on above:   Performed By: #### U  
AMIC ####  
[address] [address]   
   
                      TIBC       342 ug/dL  Normal     240 - 445  [facility]  
   
                                        Comment on above:   Performed By: #### U  
AMIC ####  
[address] [address]   
   
                                                    MAGNESIUMon 03-   
   
                      Magnesium [Mass/Vol] 2.16 mg/dL Normal     1.60 - 2.40 [facility]  
   
                                        Comment on above:   Performed By: #### U  
AMIC ####  
[address] [address]   
   
                                                    PARATHYROID HORMONE,INTACTon  
 03-   
   
                                                    PARATHYROID   
HORMONE,INTACT  42.4 pg/mL      Normal          18.5 - 88.0     [facility]  
   
                                        Comment on above:   Result Comment: Hillary  
ents receiving more than 5 mg/day of   
biotin   
may have interference  
in test results. A sample should be taken no sooner than eight   
hours  
after previous dose. Contact the testing laboratory for   
additional  
information.   
   
                                                            Performed By: #### P  
TINR ####  
[facility]  
[address] [place] [address]   
   
                                                    TSH WITH REFLEX TO FREE T4 I  
F ABNORMALon 03-   
   
                      TSH Qn     1.65 m[IU]/L Normal     0.44 - 3.98 [facility]  
   
                                        Comment on above:   Result Comment: Note  
 new pediatric reference range as of   
2020.  
TSH testing is performed using different testing  
methodology at [facility] than at other  
[facility]. Direct result comparisons should  
only be made within the same method.   
   
                                                            Performed By: #### P  
TINR ####  
[address] [place] [address]   
   
                                                    VITAMIN B12on 03-   
   
                                                    Cobalamin (Vitamin   
B12) [Mass/Vol] 372 pg/mL       Normal          211 - 911       [facility]  
   
                                        Comment on above:   Performed By: #### P  
TINR ####  
[address] [address]   
   
                                                    VITAMIN D, 25-HYDROXYon    
   
                      VITAMIN D, 25-HYDROXY 18 ng/mL   Abnormal              [facility]  
   
                                        Comment on above:   Result Comment: .  
DEFICIENCY: < 20 NG/ML  
INSUFFICIENCY: 20-29 NG/ML  
SUFFICIENCY:  NG/ML  
THIS ASSAY ACCURATELY QUANTIFIES THE SUM OF  
VITAMIN D3, 25-HYDROXY AND VIT D2,25-HYDROXY.  
{   
   
                                                            Performed By: #### P  
TINR ####  
[address] [address]   
   
                                                    Bariatric Surgery - Follow-U  
karthikeyan 2020   
   
                                                    Bariatric Surgery -   
Follow-Up                               Chief Complaint  
The patient is being seen   
for post operative visit.  
The patient is being seen   
today for a 6 week   
post-op visit. Type of   
surgery: London-y Gastric   
Bypass.  
  
History of Present   
Illness  
Weight:.  
Initial weight: 281 lbs.  
Last visit weight: 256   
lbs.  
Current weight: 237.8   
lbs.  
The patient's weight was   
281 lbs at pre-op visit.  
Total weight lost: 44   
lbs.  
Distribution of obesity   
is general.  
Severity of obesity is   
Class 2 which is a BMI of   
35-39.9.  
Food: Breakfast: One egg,   
Snack: Protein shake,   
Lunch: Baked potato,   
Snack: Protein shake,   
Dinner: chikli, Snack:   
Jell-O, does not drink   
carbonated beverage  
Time to eat meals: 20-30   
minutes  
Fluid intake: 64 oz.  
Diet Stage: soft food and   
regular food.  
Exercise frequency:   
patient does not   
exercise.  
Symptoms: The patient   
reports loss of appetite,   
but no hunger, no nausea,   
no vomiting, no food   
intolerance, no   
constipation, no   
diarrhea, no dumping   
syndrome and no abdominal   
pain.  
Supplements: taking   
multivitamins, taking   
B-12, taking calcium and   
taking Omeprazole.  
39-year-old female with   
history of rheumatoid   
gastric bypass 6 weeks   
ago. Patient's here for   
follow-up. She has no   
complaints. She is moving   
her bowels regularly.   
She's not exercising and   
does not plan on   
exercising until spring   
time she is getting   
plenty of fluids she is   
taking her omeprazole  
There are no   
spiritual/cultural   
practices/values/needs   
that are important to   
know  
Initial Fall Risk   
Screening:  
SHARON has not fallen   
in the last 6 months.   
SHARON does not have a   
fear of falling. She does   
not need assistance with   
sitting, standing or   
walking. Does not need   
assistance walking in her   
home. She does not need   
assistance in an   
unfamiliar setting. The   
patient is not using an   
assistive device.  
  
Review of Systems  
Negative other than HPI  
  
Active Problems  
AMA (advanced maternal   
age) multigravida 35+   
(659.63) (O09.529)  
Chronic daily headache   
(784.0) (R51)  
Common migraine without   
aura (346.10) (G43.009)  
Disorder of iron   
metabolism, unspecified   
(275.09) (E83.10)  
Encounter for   
pre-bariatric surgery   
counseling and education   
(V65.49) (Z71.89)  
Reviewed recent labs  
Paperwork completed  
Encounter for special   
screening examination for   
nutritional disorder   
(V77.99) (Z13.21)  
Excessive daytime   
sleepiness (780.54)   
(G47.19)  
Foot injury, left,   
initial encounter (959.7)   
(S99.922A)  
GERD (gastroesophageal   
reflux disease) (530.81)   
(K21.9)  
Gestational diabetes   
mellitus (648.80)   
(O24.419)  
Left ankle sprain   
(845.00) (S93.402A)  
Migraine (346.90)   
(G43.909)  
Has been quiescent for   
now  
Morbid obesity (278.01)   
(E66.01)  
We will run baseline   
blood tests  
Referral to bariatric   
surgery after review of   
test results  
Morbid obesity with BMI   
of 45.0-49.9, adult   
(278.01,V85.42)   
(E66.01,Z68.42)  
KATHY (obstructive sleep   
apnea) (327.23) (G47.33)  
Post-op pain (338.18)   
(G89.18)  
Post-operative nausea and   
vomiting (787.01)   
(R11.2,Z98.890)  
Pre-bariatric surgery   
nutrition evaluation   
(V65.3) (Z71.3)  
S/P gastric bypass   
(V45.86) (Z98.84)  
Screening for diabetes   
mellitus (V77.1) (Z13.1)  
A1c  
Sprain of foot, left,   
initial encounter   
(845.10) (S93.602A)  
Vitamin D deficiency   
(268.9) (E55.9)  
Status post treatment  
Vitamin D level ordered  
Past Medical History  
History of anxiety   
(V11.8) (Z86.59)  
History of vitamin D   
deficiency (V12.1)   
(Z86.39)  
History of Migraine   
headache (346.90)   
(G43.909)  
Surgical History  
History of Dilation And   
Curettage  
History of London-en-Y   
gastric bypass  
Family History  
Family history of   
diabetes mellitus (V18.0)   
(Z83.3)  
Family history of   
cerebrovascular accident   
(CVA) (V17.1) (Z82.3)  
Social History  
Former smoker (V15.82)   
(Z87.891)  
No alcohol use  
No caffeine use  
Non-smoker (V49.89)   
(Z78.9)  
Allergies  
sulfa  
Recorded By: Karen Day; 10/7/2014   
11:07:12 AM  
Current Meds  
Iron 325 (65 Fe) MG Oral   
Tablet; TAKE 1 TABLET   
Twice daily 2 hrs   
separate from  
calcium, take with MVI;  
Therapy: 03Hjf3686 to   
(Evaluate:61Byq0757)   
Requested for: 78Qgf3990;   
Last  
Rx:[id number] Ordered  
Rx By: Leydi Luong;   
Dispense: 30 Days ; #:60   
Tablet; Refill: 2;For:   
Disorder of iron   
metabolism, unspecified;   
BRYON = N; Verified   
Transmission to [facility] #[id number]  
Vitamin C Oral Tablet   
Chewable; TAKE 1 TABLET 3   
times daily Take 1 tablet   
with your  
Iron each time;  
Therapy: 12Xxa1788 to   
(Evaluate:13Fyq5580)   
Requested for: 17Grl1149;   
Last  
Rx:21Jsi4658 Ordered  
Rx By: Leydi Luong;   
Dispense: 30 Days ; #:90   
Tablet; Refill: 5;For:   
Disorder of iron   
metabolism, unspecified;   
BRYON = N; Verified   
Transmission to [facility] #[id number]; Msg to   
Pharmacy: 500mg dose   
chewables  
Omeprazole 40 MG Oral   
Capsule Delayed Release;   
TAKE 1 CAPSULE Daily;  
Therapy: 22Bft6170 to   
(Last Rx:71Wjl8161)   
Requested for: 67Xqd2082   
Ordered  
Rx By: Leydi Luong;   
Dispense: 0 Days ; #:30   
Capsule; Refill: 2;For:   
GERD (gastroesophageal   
reflux disease); BRYON = N;   
Verified Transmission to   
[facility]   
#[id number]  
Omeprazole 40 MG Oral   
Capsule Delayed Release;   
TAKE 1 CAPSULE Daily Open  
capsule, sprinkle in SF   
applesauce or pudding,   
swallow. DO NOT CHEW;  
Therapy: 23Ahl4205 to   
(Last Rx:53Qaq8857)   
Requested for: 12Lzl7699   
Ordered  
Rx By: Leydi Luong;   
Dispense: 0 Days ; #:30   
Capsule; Refill: 2;For:   
GERD (gastroesophageal   
reflux disease),   
Post-operative nausea and   
vomiting; BRYON = N;   
Verified Transmission to   
[facility]   
#[id number]  
Gabapentin 100 MG Oral   
Capsule; Take 3 capsules   
at bedtime starting 2   
days before  
surgery. Take 1 capsule 3   
times daily after   
surgery, open capsule,   
take with SF pudding;  
Therapy: 46Yuk5622 to   
(Last Rx:48Tij1543)   
Requested for: 48Ohs2040   
Ordered  
Rx By: Leydi Luong;   
Dispense: 0 Days ; #:27   
Capsule; Refill: 0;For:   
Post-operative nausea and   
vomiting; BRYON = N;   
Verified Transmission to   
[facility]   
#[id number]  
Ondansetron 4 MG Oral   
Tablet Disintegrating;   
take 1-2 tablets every 8   
hours as needed  
for nausea;  
Therapy: 55Stg5572 to   
(Last Rx:04Gaa4485)   
Requested for: 48Tes5009   
Ordered  
Rx By: Leydi Luong;   
Dispense: 0 Days ; #:30   
Tablet; Refill: 2;For:   
Post-operative nausea and   
vomiting; BRYON = N;   
Verified Transmission to   
[facility]   
#[id number]  
oxyCODONE HCl - 5 MG/5ML   
Oral Solution; TAKE 5 ML   
Every 4 hours PRN pain  
alternate with tylenol   
and heating pad;  
Therapy: 35Ngg0048 to   
(Evaluate:59Nqh5188);   
Last Rx:46Mik4866 Ordered  
Rx By: Leydi Luong;   
Dispense: 6 Days ; #:180   
Milliliter; Refill:   
0;For: Post-operative   
nausea and vomiting; BRYON   
= N; Print Rx  
Ibuprofen 200 MG Oral   
Tablet; 5 tablets daily;  
Therapy:   
(Recorded:2016) to   
Recorded  
Dispense: 0 Days ; #:   
Sufficient Tablet;   
Refill: 0; BRYON = N;   
Record; Last Updated By:   
Mariana Tipton;   
2016 10:10:18 AM  
Vitals  
Vital Signs  
Recorded: 2020   
09:59AM  
Heart Rate69  
Tqgltphx827  
Xmllgcvzv10  
Height5 ft 5 in  
Gxkksq516 lb 0.8 oz  
BMI Tbjkjqvspq36.45  
BSA Calculated2.13  
Physical Exam  
Eyes: Conjunctiva non   
-icteric and eye lids are   
without obvious rash or   
drooping. Pupils are   
symmetric.  
Ears, Nose, Mouth, and   
Throat: External ears and   
nose appear to be without   
deformity or rash. No   
lesions or masses noted.   
Hearing is grossly   
intact.  
Neck:. No JVD noted,   
tracheal position is   
midline. No thyromegaly,   
no thyroid nodules  
Head and Face:   
Examination of the head   
and face revealed no   
abnormalities.  
Respiratory: No gasping   
or shortness of breath   
noted, no use of   
accessory muscles noted.   
Clear to auscultate   
bilaterally  
Cardiovascular:   
Examination for edema is   
normal. Regular rate and   
rhythm S1 S2 without   
murmurs  
GI: Abdomen nontender to   
palpation. Soft nontender   
bowel sounds present no   
hepatosplenomegaly L   
incisions are well-healed   
well approximated and   
nontender no hernias are   
present  
Skin: No rashes or open   
lesions/ulcers identified   
on skin.  
Musk: Digits/nails show   
no clubbing or cyanosis.   
No asymmetry or masses   
noted of the musculature.   
Examination of the   
muscles/joints/bones show   
normal range of motion.   
Gait is grossly normally.  
Neurologic: Cranial   
nerves II- XII intact,   
motor strength 5/5 muscle   
strength of the lower   
extremities bilaterally   
and equal.  
  
Diagnoses/Problems  
S/P gastric bypass   
(V45.86) (Z98.84)  
Provider Impressions  
39-year-old female with a   
history of London-en-Y   
bypass 6 weeks ago.   
Patient's general she has   
no complaints  
Continue omeprazole  
Consider starting   
exercise before spring  
Follow-up 6 weeks  
  
Patient   
Discussion/Summary  
6 Weeks P/O visit  
You are doing great six   
weeks s/p VSG/RYGBP  
Remember the Rules of 30   
and 60.  
Remember to increase your   
fluids and protein to   
goals  
Do not eat and drink at   
the same time.  
Advance your diet   
according to the diet   
guidelines to regular   
food.  
See the dietician as   
scheduled regularly.  
Slowly increase your   
exercise and activity, as   
you get all your protein   
you will be able to have   
more energy.  
Take your omeprazole,   
open the capsule and   
sprinkle onto applesauce.   
Do not swallow whole.  
Take your Multivitamin,   
B12 and calcium tablets.  
Follow-up in 6 weeks for   
your 3 month visit, we   
will draw labs at that   
time.  
  
  
Signatures  
Electronically signed by   
: Jana Barrios PA-C;   
2020 10:00AM EST   
(Author)            Normal                                     
[employer]  
   
                                                    Dietition Noteon 2020   
   
                                        Dietition Note      Chief Complaint  
  
obesity  
6 week post op nutrition   
follow up  
  
Active Problems  
AMA (advanced maternal   
age) multigravida 35+   
(659.63) (O09.529)  
Chronic daily headache   
(784.0) (R51)  
Common migraine without   
aura (346.10) (G43.009)  
Disorder of iron   
metabolism, unspecified   
(275.09) (E83.10)  
Encounter for   
pre-bariatric surgery   
counseling and education   
(V65.49) (Z71.89)  
Reviewed recent labs  
Paperwork completed  
Encounter for special   
screening examination for   
nutritional disorder   
(V77.99) (Z13.21)  
Excessive daytime   
sleepiness (780.54)   
(G47.19)  
Foot injury, left,   
initial encounter (959.7)   
(S99.922A)  
GERD (gastroesophageal   
reflux disease) (530.81)   
(K21.9)  
Gestational diabetes   
mellitus (648.80)   
(O24.419)  
Left ankle sprain   
(845.00) (S93.402A)  
Migraine (346.90)   
(G43.909)  
Has been quiescent for   
now  
Morbid obesity (278.01)   
(E66.01)  
We will run baseline   
blood tests  
Referral to bariatric   
surgery after review of   
test results  
Morbid obesity with BMI   
of 45.0-49.9, adult   
(278.01,V85.42)   
(E66.01,Z68.42)  
KATHY (obstructive sleep   
apnea) (327.23) (G47.33)  
Post-op pain (338.18)   
(G89.18)  
Post-operative nausea and   
vomiting (787.01)   
(R11.2,Z98.890)  
Pre-bariatric surgery   
nutrition evaluation   
(V65.3) (Z71.3)  
S/P gastric bypass   
(V45.86) (Z98.84)  
Screening for diabetes   
mellitus (V77.1) (Z13.1)  
A1c  
Sprain of foot, left,   
initial encounter   
(845.10) (S93.602A)  
Vitamin D deficiency   
(268.9) (E55.9)  
Status post treatment  
Vitamin D level ordered  
Past Medical History  
History of anxiety   
(V11.8) (Z86.59)  
History of vitamin D   
deficiency (V12.1)   
(Z86.39)  
History of Migraine   
headache (346.90)   
(G43.909)  
Surgical History  
History of Dilation And   
Curettage  
History of [place]-en-Y   
gastric bypass  
Family History  
Family history of   
diabetes mellitus (V18.0)   
(Z83.3)  
Family history of   
cerebrovascular accident   
(CVA) (V17.1) (Z82.3)  
Social History  
Former smoker (V15.82)   
(Z87.891)  
No alcohol use  
No caffeine use  
Non-smoker (V49.89)   
(Z78.9)  
Allergies  
sulfa  
Recorded By: Karen Day; 10/7/2014   
11:07:12 AM  
Current Meds  
Iron 325 (65 Fe) MG Oral   
Tablet; TAKE 1 TABLET   
Twice daily 2 hrs   
separate from  
calcium, take with MVI;  
Therapy: 16Vut2673 to   
(Evaluate:27Ipe0698)   
Requested for: 75Ela1914;   
Last  
Rx:14Ijh6590 Ordered  
Rx By: Leydi Luong;   
Dispense: 30 Days ; #:60   
Tablet; Refill: 2;For:   
Disorder of iron   
metabolism, unspecified;   
BRYON = N; Verified   
Transmission to [facility] 9[address]14  
Vitamin C Oral Tablet   
Chewable; TAKE 1 TABLET 3   
times daily Take 1 tablet   
with your  
Iron each time;  
Therapy: 56Axs2770 to   
(Evaluate:22Nag0157)   
Requested for: 76Jzv2837;   
Last  
Rx:30Jvz3981 Ordered  
Rx By: Leydi Luong;   
Dispense: 30 Days ; #:90   
Tablet; Refill: 5;For:   
Disorder of iron   
metabolism, unspecified;   
BRYON = N; Verified   
[facility]; Msg to   
Pharmacy: 500mg dose   
chewables  
Omeprazole 40 MG Oral   
Capsule Delayed Release;   
TAKE 1 CAPSULE Daily;  
Therapy: 88Dhj3559 to   
(Last Rx:43Vur3240)   
Requested for: 00Jnz9540   
Ordered  
Rx By: Leydi Luong;   
Dispense: 0 Days ; #:30   
Capsule; Refill: 2;For:   
GERD (gastroesophageal   
reflux disease); BRYON = N;   
Verified Transmission to   
[facility]   
[address]  
Omeprazole 40 MG Oral   
Capsule Delayed Release;   
TAKE 1 CAPSULE Daily Open  
capsule, sprinkle in SF   
applesauce or pudding,   
swallow. DO NOT CHEW;  
Therapy: 53Fjc5265 to   
(Last Rx:56Ebo9767)   
Requested for: 12Yyl9218   
Ordered  
Rx By: Leydi Luong;   
Dispense: 0 Days ; #:30   
Capsule; Refill: 2;For:   
GERD (gastroesophageal   
reflux disease),   
Post-operative nausea and   
vomiting; BRYON = N;   
Verified Transmission to   
[facility]   
[address]  
Gabapentin 100 MG Oral   
Capsule; Take 3 capsules   
at bedtime starting 2   
days before  
surgery. Take 1 capsule 3   
times daily after   
surgery, open capsule,   
take with SF pudding;  
Therapy: 31Izh6703 to   
(Last Rx:85Rsh5650)   
Requested for: 91Bdn2116   
Ordered  
Rx By: Leydi Luong;   
Dispense: 0 Days ; #:27   
Capsule; Refill: 0;For:   
Post-operative nausea and   
vomiting; BRYON = N;   
Verified Transmission to   
[facility]   
[address]  
Ondansetron 4 MG Oral   
Tablet Disintegrating;   
take 1-2 tablets every 8   
hours as needed  
for nausea;  
Therapy: 52Btc3627 to   
(Last Rx:38Aaz9815)   
Requested for: 80Bct8212   
Ordered  
Rx By: Leydi Luong;   
Dispense: 0 Days ; #:30   
Tablet; Refill: 2;For:   
Post-operative nausea and   
vomiting; BRYON = N;   
Verified Transmission to   
[facility]   
[address]  
oxyCODONE HCl - 5 MG/5ML   
Oral Solution; TAKE 5 ML   
Every 4 hours PRN pain  
alternate with tylenol   
and heating pad;  
Therapy: 99Uvl0978 to   
(Evaluate:2019);   
Last Rx:04Gqo8823 Ordered  
Rx By: Leydi Luong;   
Dispense: 6 Days ; #:180   
Milliliter; Refill:   
0;For: Post-operative   
nausea and vomiting; BRYON   
= N; Print Rx  
Ibuprofen 200 MG Oral   
Tablet; 5 tablets daily;  
Therapy:   
(Recorded:2016) to   
Recorded  
Dispense: 0 Days ; #:   
Sufficient Tablet;   
Refill: 0; BRYON = N;   
Record; Last Updated By:   
Mariana Tipton;   
2016 10:10:18 AM  
Provider Impressions  
NAME: Sharon Fischer   
DATE: 20 : [birth year]  
Surgery Date: 19  
Surgeon: Bethany  
Procedure: gastric bypass  
ASSESSMENT:  
Current weight: 237.8 Ht:   
65.0 in. BMI: 45.4  
Previous weight: 256.4   
19  
Initial start weight:   
277.4 1.  
EBW: 131.2  
Total weight change: 39.6  
%EBW Lost: 30.2%  
PROGRESS:  
Nutrition Interventions   
for last encounter (date   
19):  
1.You are not drinking   
enough fluid to meet your   
goal. Eat less applesauce   
and increase fluid intake   
to meet you goal.  
2.You are not meeting   
your protein goal.   
Continue to work   
increasing protein intake   
to meet your goal.  
3.Continue to drink your   
protein shakes to meet   
your goal of 60-70 g of   
protein per day. Begin   
measuring how much   
protein you can eat on   
the soft diet so that you   
know when to start   
weaning off of your   
protein shakes.  
4.Start no drinking 30   
min before, during the   
meal and for 30 minutes   
after the meal  
5.Continue to exercise  
6.Advance to the puree   
diet for 1 week, then   
soft food for 3 weeks  
7.Remember to eat slowly   
and chew thoroughly  
8.Try one new food at a   
time to test for any   
intolerances.  
9.Start taking your   
calcium and B12 daily  
CHANGES IN TREATMENT:  
Patient met goals:   
Partially  
Actions to meet previous   
goals:  
1.You are not taking   
enough calcium. You need   
to take 1 tbsp., 3x/day   
for a total of 3 tbsp.   
that will provide 1500 mg   
of calcium.  
2.Start doing something   
for exercise.  
24 hour food recall: Pt   
on a soft diet; consume   
60 g protein and 64 oz   
fluid daily  
Breakfast: scrambled egg  
Snack: protein shake  
Lunch: baked potato  
Snack: protein shake  
Dinner: chili made with   
pork and beef  
Snack: Jello  
Beverages: water, protein   
shakes  
Alcohol: none  
Vitamins: 2 MVI, 500 mg   
calcium, and B12  
Medications: see list  
Physical Activity:  
READINESS TO LEARN:  
Motivation to learn:   
Interested  
Understanding of   
instruction: Good  
Anticipated Compliance:   
Good  
Family Support: Unable to   
assess-family not present  
Patient presents with   
post-op weight loss   
surgery gastric bypass.   
Pt is 6 weeks post op.   
Patient has lost 39.6   
pounds since initial   
assessment accounting for   
30.2% loss excess body   
weight. Tolerating a soft   
diet without difficulty.   
Protein intake is   
adequate for post-op   
individual. Fluid   
consumption is adequate.   
Patient is supplementing   
recommended   
vitamin/minerals but not   
taking enough calcium.   
Advised to take 1 tbsp.   
3x/day. Pt is not eating   
much food and relying on   
shakes for all of her   
protein. Advised to eat   
5-6x/day choosing foods   
high in protein for meals   
and snacks. Provided pt   
with a template to   
follow. Instructed pt to   
measure how much protein   
she can eat throughout   
the day. Advised to have   
a protein shake if she   
cant meet her protein   
goal. Strongly recommend   
that pt start doing   
something for exercise to   
help facilitate weight   
loss. Pt states no   
concerns/difficulties at   
this time.  
Discussed the transition   
diet. Reminded pt to eat   
slowly, chew thoroughly   
and to try on new food at   
a time.  
Malnutrition Screening  
Significant unintentional   
weight loss? n/a  
Eating less than 75% of   
usual intake for more   
than 2 weeks? n/a  
Nutrition Diagnosis:  
1.Increased protein and   
nutrition needs related   
to altered GI function as   
evidenced by pt. s/p   
gastric bypass.  
2.Food- and   
nutrition-related   
knowledge deficit related   
to lack of prior exposure   
to surgical weight loss   
information as evidenced   
by diet recall.  
3.Inadequate calcium   
intake related to not   
taking enough calcium as   
evidenced by pt reports   
only taking one dose.  
Nutrition Interventions:  
1.Modify type and amount   
of food and nutrients   
within meals and snacks.  
2.Comprehensive Nutrition   
Education  
-Nutrition education   
materials:  
Recommendations:  
1.You need to start   
working on eating food to   
meet your protein goal.   
Eat 5-6x/day and choose   
high protein foods at   
each and snack. If you   
cant meet your goal,   
drink a shake.  
2.Continue to drink 64   
oz. of zero calorie   
beverages per day  
3.Continue no drinking 30   
min before, during the   
meal and for 30 minutes   
after the meal  
4.Start doing something   
for exercise. Increase   
intensity and duration of   
exercise  
5.Advance to the   
transition diet. You may   
try raw veggies, fresh   
fruit and lean ground   
beef.  
6.Eat slowly, chew   
thoroughly  
7.Try one new food at a   
time.  
8.Continue current   
vit/min regimen. You are   
not taking enough   
calcium. You need to take   
1 tbsp., 3x/day for a   
total of 3 tbsp. that   
will provide 1500 mg of   
calcium.  
Nutrition Monitoring and   
Evaluation:  
1-2 pounds weight loss   
per week  
Criteria: weight check,   
food recall  
Need for Follow-up: 3   
months  
Caitlyn MONTILLA, [facility]  
Bariatric Surgery   
Dietitian  
Phone: [phone]  
  
Patient   
Discussion/Summary  
Recommendations:  
1.You need to start   
working on eating food to   
meet your protein goal.   
Eat 5-6x/day and choose   
high protein foods at   
each and snack. If you   
cant meet your goal,   
drink a shake.  
2.Continue to drink 64   
oz. of zero calorie   
beverages per day  
3.Continue no drinking 30   
min before, during the   
meal and for 30 minutes   
after the meal  
4.Start doing something   
for exercise. Increase   
intensity and duration of   
exercise  
5.Advance to the   
transition diet. You may   
try raw veggies, fresh   
fruit and lean ground   
beef.  
6.Eat slowly, chew   
thoroughly  
7.Try one new food at a   
time.  
8.Continue current   
vit/min regimen. You are   
not taking enough   
calcium. You need to take   
1 tbsp., 3x/day for a   
total of 3 tbsp. that   
will provide 1500 mg of   
calcium.  
Nutrition Monitoring and   
Evaluation:  
1-2 pounds weight loss   
per week  
Criteria: weight check,   
food recall  
Need for Follow-up: 3   
months  
Caitlyn MONTILLA, [facility]  
Bariatric Surgery   
Dietitian  
Phone: [phone]  
  
  
Signatures  
Electronically signed by   
: Caitlyn Betts RD; 2020 11:00AM EST (Author) [facility]                                     
[facility]  
   
                                                    Bariatric Surgery - Follow-U  
karthikeyan 2019   
   
                                                    Bariatric Surgery -   
Follow-Up                               Chief Complaint  
The patient is being seen   
for post operative visit.  
The patient is being seen   
today for a 1 week   
post-op visit. Type of   
surgery: London-y Gastric   
Bypass . Surgery date:   
19.  
  
History of Present   
Illness  
Type of Surgery: lap   
london-en-y gastric bypass,   
surgery Date: 19.  
Weight:.  
Initial weight: 281 lbs.  
Last visit weight: 266   
lbs.  
Current weight: 256 lbs.  
Severity of obesity is   
Class 3 which is a BMI of   
greater than or equal to   
40.  
Food: Breakfast: 1   
protein shake, Lunch:   
applesauce, Dinner:   
pudding, does not drink   
carbonated beverage  
Fluid intake: 30 oz.  
Diet Stage: liquid.  
Exercise includes   
walking.  
Comorbidities: anxiety,   
esophageal reflux and   
sleep apnea using an   
appliance.  
38 yo F, here 1 week   
post-op, overall doing   
well. Denies any acute   
issues.  
She is having gas pain(   
belching)  
Nausea occ. only -   
resolved with zofran  
2 BMs today.  
Urine- yellow- but not   
terribly dark. Urinating   
a lot.  
C/o chest pain- but as   
gas pressure in her mid   
chest associated with   
belching.  
Incisions healing C/D/I  
  
Review of Systems  
Constitutional: no   
chills, no fever and no   
night sweats.  
Eyes: no blurred vision   
and no eyesight problems.  
ENT: no hearing loss, no   
nasal congestion, no   
nasal discharge, no   
hoarseness and no sore   
throat.  
Cardiovascular: no chest   
pain, no intermittent leg   
claudication, no lower   
extremity edema, no   
palpitations and no   
syncope.  
Respiratory: no cough, no   
shortness of breath   
during exertion, no   
shortness of breath at   
rest and no wheezing.  
Gastrointestinal: no   
abdominal pain, no blood   
in stools, no   
constipation, no   
diarrhea, no melena, no   
nausea, no rectal pain   
and no vomiting.  
Genitourinary: no   
dysuria, no change in   
urinary frequency, no   
urinary hesitancy, no   
feelings of urinary   
urgency and no vaginal   
discharge.  
Musculoskeletal: no   
arthralgias, no back pain   
and no myalgias.  
Integumentary: no new   
skin lesions and no   
rashes.  
Neurological: no   
difficulty walking, no   
headache, no limb   
weakness, no numbness and   
no tingling.  
Psychiatric: no anxiety,   
no depression, no   
anhedonia and no   
substance use disorders.  
Endocrine: no recent   
weight gain and no recent   
weight loss.  
Hematologic/Lymphatic: no   
tendency for easy   
bruising and no swollen   
glands.  
10-point ROS negative   
other than noted in HPI.  
  
Active Problems  
AMA (advanced maternal   
age) multigravida 35+   
(659.63) (O09.529)  
Chronic daily headache   
(784.0) (R51)  
Common migraine without   
aura (346.10) (G43.009)  
Disorder of iron   
metabolism, unspecified   
(275.09) (E83.10)  
Encounter for   
pre-bariatric surgery   
counseling and education   
(V65.49) (Z71.89)  
Reviewed recent labs  
Paperwork completed  
Encounter for special   
screening examination for   
nutritional disorder   
(V77.99) (Z13.21)  
Excessive daytime   
sleepiness (780.54)   
(G47.19)  
Foot injury, left,   
initial encounter (959.7)   
(S99.922A)  
GERD (gastroesophageal   
reflux disease) (530.81)   
(K21.9)  
Gestational diabetes   
mellitus (648.80)   
(O24.419)  
Left ankle sprain   
(845.00) (S93.402A)  
Migraine (346.90)   
(G43.909)  
Has been quiescent for   
now  
Morbid obesity (278.01)   
(E66.01)  
We will run baseline   
blood tests  
Referral to bariatric   
surgery after review of   
test results  
Morbid obesity with BMI   
of 45.0-49.9, adult   
(278.01,V85.42)   
(E66.01,Z68.42)  
KATHY (obstructive sleep   
apnea) (327.23) (G47.33)  
Post-op pain (338.18)   
(G89.18)  
Post-operative nausea and   
vomiting (787.01)   
(R11.2,Z98.890)  
Pre-bariatric surgery   
nutrition evaluation   
(V65.3) (Z71.3)  
Screening for diabetes   
mellitus (V77.1) (Z13.1)  
A1c  
Sprain of foot, left,   
initial encounter   
(845.10) (S93.602A)  
Vitamin D deficiency   
(268.9) (E55.9)  
Status post treatment  
Vitamin D level ordered  
Past Medical History  
History of anxiety   
(V11.8) (Z86.59)  
History of vitamin D   
deficiency (V12.1)   
(Z86.39)  
History of Migraine   
headache (346.90)   
(G43.909)  
Surgical History  
History of Dilation And   
Curettage  
Family History  
Family history of   
diabetes mellitus (V18.0)   
(Z83.3)  
Family history of   
cerebrovascular accident   
(CVA) (V17.1) (Z82.3)  
Social History  
Former smoker (V15.82)   
(Z87.891)  
No alcohol use  
No caffeine use  
Non-smoker (V49.89)   
(Z78.9)  
Allergies  
sulfa  
Recorded By: Karen Day; 10/7/2014   
11:07:12 AM  
Current Meds  
Iron 325 (65 Fe) MG Oral   
Tablet; TAKE 1 TABLET   
Twice daily 2 hrs   
separate from  
calcium, take with MVI;  
Therapy: 44Xrn9650 to   
(Evaluate:26Qud1443)   
Requested for: 33Noj9357;   
Last  
Rx:48Ade6065 Ordered  
Rx By: Leydi Luong;   
Dispense: 30 Days ; #:60   
Tablet; Refill: 2;For:   
Disorder of iron   
metabolism, unspecified;   
BRYON = N; Verified   
Transmission to [facility] #11398  
Vitamin C Oral Tablet   
Chewable; TAKE 1 TABLET 3   
times daily Take 1 tablet   
with your  
Iron each time;  
Therapy: 95Wbl5835 to   
(Evaluate:98Beq3468)   
Requested for: 13Aoo1189;   
Last  
Rx:33Nen0561 Ordered  
Rx By: Leydi Luong;   
Dispense: 30 Days ; #:90   
Tablet; Refill: 5;For:   
Disorder of iron   
metabolism, unspecified;   
BRYON = N; Verified   
Transmission to [facility] #04633; Msg to   
Pharmacy: 500mg dose   
chewables  
Omeprazole 40 MG Oral   
Capsule Delayed Release;   
TAKE 1 CAPSULE Daily;  
Therapy: 72Poh7811 to   
(Last Rx:77Hrf9401)   
Requested for: 84Nfw2853   
Ordered  
Rx By: Lyedi Luong;   
Dispense: 0 Days ; #:30   
Capsule; Refill: 2;For:   
GERD (gastroesophageal   
reflux disease); BRYON = N;   
Verified Transmission to   
[facility]   
#18802  
Omeprazole 40 MG Oral   
Capsule Delayed Release;   
TAKE 1 CAPSULE Daily Open  
capsule, sprinkle in SF   
applesauce or pudding,   
swallow. DO NOT CHEW;  
Therapy: 97Hqm1545 to   
(Last Rx:53Bsp1682)   
Requested for: 51Ews2713   
Ordered  
Rx By: Leydi Luong;   
Dispense: 0 Days ; #:30   
Capsule; Refill: 2;For:   
GERD (gastroesophageal   
reflux disease),   
Post-operative nausea and   
vomiting; BRYON = N;   
Verified Transmission to   
[facility]   
#68120  
Gabapentin 100 MG Oral   
Capsule; Take 3 capsules   
at bedtime starting 2   
days before  
surgery. Take 1 capsule 3   
times daily after   
surgery, open capsule,   
take with SF pudding;  
Therapy: 53Wza6654 to   
(Last Rx:28Zbm1532)   
Requested for: 96Nre4256   
Ordered  
Rx By: Leydi Luong;   
Dispense: 0 Days ; #:27   
Capsule; Refill: 0;For:   
Post-operative nausea and   
vomiting; BRYON = N;   
Verified Transmission to   
[facility]   
#89003  
Ondansetron 4 MG Oral   
Tablet Disintegrating;   
take 1-2 tablets every 8   
hours as needed  
for nausea;  
Therapy: 68Ufy1876 to   
(Last Rx:73Ncg9711)   
Requested for: 52Gxs8264   
Ordered  
Rx By: Leydi Luong;   
Dispense: 0 Days ; #:30   
Tablet; Refill: 2;For:   
Post-operative nausea and   
vomiting; BRYON = N;   
Verified Transmission to   
[facility]   
#57597  
oxyCODONE HCl - 5 MG/5ML   
Oral Solution; TAKE 5 ML   
Every 4 hours PRN pain  
alternate with tylenol   
and heating pad;  
Therapy: 33Cri1642 to   
(Evaluate:18Pij7165);   
Last Rx:87Dls6637 Ordered  
Rx By: Leydi Luong;   
Dispense: 6 Days ; #:180   
Milliliter; Refill:   
0;For: Post-operative   
nausea and vomiting; BRYON   
= N; Print Rx  
Ibuprofen 200 MG Oral   
Tablet; 5 tablets daily;  
Therapy:   
(Recorded:2016) to   
Recorded  
Dispense: 0 Days ; #:   
Sufficient Tablet;   
Refill: 0; BRYON = N;   
Record; Last Updated By:   
Mariana Tipton;   
2016 10:10:18 AM  
Vitals  
Vital Signs  
Recorded: 78Yrn7798   
09:55AMRecorded:   
40Zsd0001 09:49AM  
Heart Poio93173  
Ximkambcflq25.8 F  
Qhpgtkctgig96  
Qwqnqqeu018  
Opkbtapoc13  
Height5 ft 5 in  
Xdmqoi239 lb  
BMI Cptgwkcjcl72.53  
BSA Calculated2.98  
Physical Exam  
Constitutional: No acute   
distress, well appearing   
and well nourished.  
Abdominal exam: soft and   
nontender.  
Incision:. clean, dry and   
intact.  
Eyes - Pupils and irises:   
Equal, round, reactive to   
light. Ocular Motility   
Exam: Extraocular   
movements intact (EOMI).  
Pulmonary - Respiratory   
effort: Normal, no GFR.  
Cardiovascular -   
Examination of   
extremities for edema: No   
peripheral edema.  
Musculoskeletal - Joints,   
bones, and muscles:   
Normal. Muscle   
strength/tone: Normal.  
Skin - Assessment of   
incision: Clean, dry, and   
intact.  
Neurologic - Cranial   
Nerve Exam: (Two) II   
through (twelve) XII   
intact grossly.   
Coordination: Normal   
gait.  
Psychiatric - Orientation   
to person, place, and   
time: Normal. Mood and   
affect: Normal.  
  
Diagnoses/Problems  
Morbid obesity (278.01)   
(E66.01)  
We will run baseline   
blood tests  
Referral to bariatric   
surgery after review of   
test results  
S/P gastric bypass   
(V45.86) (Z98.84)  
Provider Impressions  
Status post gastric   
bypass last week  
Was doing okay however   
started having worsening   
chest and shoulder pains   
or the weekend and went   
emergency room  
CT scan was negative for   
pulmonary embolism there   
was no evidence of any   
acute intra-abdominal   
pathology  
Labs were unremarkable  
She was sent home  
She has been doing fairly   
well however still not   
meeting her liquid intake  
Urine output is good  
Bowel function and is   
returned  
Ambulating well otherwise  
Denies any chest pain or   
shortness of breath now   
denies any fevers chills  
Incisions are healing   
well  
Examination is benign  
She is afebrile heart   
rate is in 80s  
She is saturating well  
Impression: Doing well  
Recommendations:  
Advance diet as advised  
Try meeting liquid goals  
Continue PPI  
Start multivitamin   
supplementation along   
with calcium  
Follow-up depending on   
progress.  
  
Patient   
Discussion/Summary  
Plan: No activity   
restrictions.  
Instructions /   
Recommendations:  
You should be drinking at   
least 60 oz of   
noncaffeinated fluid   
daily. Avoid carbonated   
beverages and those with   
sugar such as juices.  
You should be getting   
around 60 g of protein   
daily. Try to get as much   
of this through solid   
food as possible. Avoid   
shakes and use them when   
you are in a rush only.  
You should plan your   
meals for the week. You   
should make grocery lists   
and pack your lunches.   
Avoid fast food and   
picking  convenience    
foods. Try to only eat   
what you have planned or   
packed.  
Do not skip meals. You   
will not be able to eat a   
large volume at any one   
meal. Therefore you will   
need to eat your protein   
and vegetables through   
the day. Aim for 3 meals   
and 2 snacks daily.  
Remember to take your   
vitamin supplements as   
directed. If you have any   
questions or concerns   
regarding your vitamins,   
please contact the   
dietician.  
Remember to take your   
multivitamins 2 times   
daily, once in the   
morning and once in the   
evening.  
You need to increase your   
daily exercise. Your goal   
is 60 minutes/day. This   
is part of a healthy   
lifestyle. You can try   
walking, swimming, going   
to a local gym, or even   
chair dancing! The more   
movement you have in your   
day, the healthier you   
will feel. Once you reach   
60 minutes then increase   
the intensity of your   
exercise.  
Come to support groups.   
The schedule is online.  
Instructed to call the   
office at for concerns,   
questions, or problems.   
[phone].  
The patient was   
instructed to follow up   
in 5 weeks.  
  
Signatures  
Electronically signed by   
: Leydi Luong MD; Dec   
26 2019 10:05AM EST   
(Author)            Normal                                     
[employer]  
   
                                                    Dietition Noteon 2019   
   
                                        Dietition Note      Chief Complaint  
  
obesity  
1 week post op nutrition   
follow up  
  
Active Problems  
AMA (advanced maternal   
age) multigravida 35+   
(659.63) (O09.529)  
Chronic daily headache   
(784.0) (R51)  
Common migraine without   
aura (346.10) (G43.009)  
Disorder of iron   
metabolism, unspecified   
(275.09) (E83.10)  
Encounter for   
pre-bariatric surgery   
counseling and education   
(V65.49) (Z71.89)  
Reviewed recent labs  
Paperwork completed  
Encounter for special   
screening examination for   
nutritional disorder   
(V77.99) (Z13.21)  
Excessive daytime   
sleepiness (780.54)   
(G47.19)  
Foot injury, left,   
initial encounter (959.7)   
(S99.922A)  
GERD (gastroesophageal   
reflux disease) (530.81)   
(K21.9)  
Gestational diabetes   
mellitus (648.80)   
(O24.419)  
Left ankle sprain   
(845.00) (S93.402A)  
Migraine (346.90)   
(G43.909)  
Has been quiescent for   
now  
Morbid obesity (278.01)   
(E66.01)  
We will run baseline   
blood tests  
Referral to bariatric   
surgery after review of   
test results  
Morbid obesity with BMI   
of 45.0-49.9, adult   
(278.01,V85.42)   
(E66.01,Z68.42)  
KATHY (obstructive sleep   
apnea) (327.23) (G47.33)  
Post-op pain (338.18)   
(G89.18)  
Post-operative nausea and   
vomiting (787.01)   
(R11.2,Z98.890)  
Pre-bariatric surgery   
nutrition evaluation   
(V65.3) (Z71.3)  
S/P gastric bypass   
(V45.86) (Z98.84)  
Screening for diabetes   
mellitus (V77.1) (Z13.1)  
A1c  
Sprain of foot, left,   
initial encounter   
(845.10) (S93.602A)  
Vitamin D deficiency   
(268.9) (E55.9)  
Status post treatment  
Vitamin D level ordered  
Past Medical History  
History of anxiety   
(V11.8) (Z86.59)  
History of vitamin D   
deficiency (V12.1)   
(Z86.39)  
History of Migraine   
headache (346.90)   
(G43.909)  
Surgical History  
History of Dilation And   
Curettage  
History of [place]   
gastric bypass  
Family History  
Family history of   
diabetes mellitus (V18.0)   
(Z83.3)  
Family history of   
cerebrovascular accident   
(CVA) (V17.1) (Z82.3)  
Social History  
Former smoker (V15.82)   
(Z87.891)  
No alcohol use  
No caffeine use  
Non-smoker (V49.89)   
(Z78.9)  
Allergies  
sulfa  
Recorded By: Karen Day; 10/7/2014   
11:07:12 AM  
Current Meds  
Iron 325 (65 Fe) MG Oral   
Tablet; TAKE 1 TABLET   
Twice daily 2 hrs   
separate from  
calcium, take with MVI;  
Therapy: 24Wlv3944 to   
(Evaluate:12Bja6687)   
Requested for: 34Dri5757;   
Last  
Rx:11Yss8719 Ordered  
Rx By: Leydi Luong;   
Dispense: 30 Days ; #:60   
Tablet; Refill: 2;For:   
Disorder of iron   
metabolism, unspecified;   
BRYON = N; Verified   
Transmission to [facility] 32602  
Vitamin C Oral Tablet   
Chewable; TAKE 1 TABLET 3   
times daily Take 1 tablet   
with your  
Iron each time;  
Therapy: 04Kix3493 to   
(Evaluate:87Pod9754)   
Requested for: 34Lpk8804;   
Last  
Rx:77Tev7179 Ordered  
Rx By: Leydi Luong;   
Dispense: 30 Days ; #:90   
Tablet; Refill: 5;For:   
Disorder of iron   
metabolism, unspecified;   
BRYON = N; Verified   
Transmission to [facility] 26183; Msg to   
Pharmacy: 500mg dose   
chewables  
Omeprazole 40 MG Oral   
Capsule Delayed Release;   
TAKE 1 CAPSULE Daily;  
Therapy: 54Hbp7265 to   
(Last Rx:74Peb3995)   
Requested for: 24Skj7522   
Ordered  
Rx By: Leydi Luong;   
Dispense: 0 Days ; #:30   
Capsule; Refill: 2;For:   
GERD (gastroesophageal   
reflux disease); BRYON = N;   
Verified Transmission to   
[facility]   
71048  
Omeprazole 40 MG Oral   
Capsule Delayed Release;   
TAKE 1 CAPSULE Daily Open  
capsule, sprinkle in SF   
applesauce or pudding,   
swallow. DO NOT CHEW;  
Therapy: 42Qhf5494 to   
(Last Rx:46Kzy2043)   
Requested for: 58Vwo4565   
Ordered  
Rx By: Leydi Luong;   
Dispense: 0 Days ; #:30   
Capsule; Refill: 2;For:   
GERD (gastroesophageal   
reflux disease),   
Post-operative nausea and   
vomiting; BRYON = N;   
Verified Transmission to   
[facility]   
60051  
Gabapentin 100 MG Oral   
Capsule; Take 3 capsules   
at bedtime starting 2   
days before  
surgery. Take 1 capsule 3   
times daily after   
surgery, open capsule,   
take with SF pudding;  
Therapy: 62Kdx5536 to   
(Last Rx:69Cez6887)   
Requested for: 62Dlr4082   
Ordered  
Rx By: Leydi Luong;   
Dispense: 0 Days ; #:27   
Capsule; Refill: 0;For:   
Post-operative nausea and   
vomiting; BRYON = N;   
Verified Transmission to   
[facility]   
47142  
Ondansetron 4 MG Oral   
Tablet Disintegrating;   
take 1-2 tablets every 8   
hours as needed  
for nausea;  
Therapy: 85Pqc1257 to   
(Last Rx:35Oha9744)   
Requested for: 50Kfv1264   
Ordered  
Rx By: Leydi Luong;   
Dispense: 0 Days ; #:30   
Tablet; Refill: 2;For:   
Post-operative nausea and   
vomiting; BRYON = N;   
Verified Transmission to   
[facility]   
17665  
oxyCODONE HCl - 5 MG/5ML   
Oral Solution; TAKE 5 ML   
Every 4 hours PRN pain  
alternate with tylenol   
and heating pad;  
Therapy: 85Ayb3279 to   
(Evaluate:82Lox2625);   
Last Rx:74Prz5413 Ordered  
Rx By: Leydi Luong;   
Dispense: 6 Days ; #:180   
Milliliter; Refill:   
0;For: Post-operative   
nausea and vomiting; BRYON   
= N; Print Rx  
Ibuprofen 200 MG Oral   
Tablet; 5 tablets daily;  
Therapy:   
(Recorded:2016) to   
Recorded  
Dispense: 0 Days ; #:   
Sufficient Tablet;   
Refill: 0; BRYON = N;   
Record; Last Updated By:   
Mariana Tipton;   
2016 10:10:18 AM  
Provider Impressions  
NAME: Sharon Fischer   
DATE: 19 :   
[birth year]  
Surgery Date: 19  
Surgeon: Bethany  
Procedure: gastric bypass  
ASSESSMENT:  
Current weight: 256.4Ht:   
65.0 in. BMI: 45.4  
Previous weight: 264.6   
19  
Initial start weight:   
277.4 .  
EBW: 131.2  
Total weight change: 21.0  
%EBW Lost: 16.0%  
PROGRESS:  
Nutrition Interventions   
for last encounter   
(date):  
1.Drink 64 oz of fluid   
daily  
2.Drink enough protein   
shakes to meet your goal   
of 60-70 g of protein per   
day  
3.Take 2 chewable   
multivitamins, 7485-0054   
mg of calcium citrate,   
and 500 mcg of B12 daily  
4.Get up and walk   
frequently throughout the   
day.  
CHANGES IN TREATMENT:  
Patient met goals:   
Partially  
Actions to meet previous   
goals:  
1.You are not drinking   
enough fluid to meet your   
goal. Eat less applesauce   
and increase fluid intake   
to meet you goal.  
2.You are not meeting   
your protein goal.   
Continue to work   
increasing protein intake   
to meet your goal.  
3.Start taking your   
calcium and B12 daily  
24 hour food recall: Pt   
on a full liquid diet;   
consumes 30 g protein and   
47 oz fluid daily.  
Beverages: water, protein   
shakes  
Alcohol: none  
Vitamins: 2MVI  
Medications: see list  
Physical Activity:   
walking  
READINESS TO LEARN:  
Motivation to learn:   
Interested  
Understanding of   
instruction:Good  
Anticipated Compliance:   
Good  
Family Support: Pts   
 is supportive but   
was not present for the   
apt.  
Patient presents with   
post-op weight loss   
surgery gastric bypass.   
The pt is 1 week post op.   
Patient has lost 21.0   
pounds since initial   
assessment accounting for   
16.0% loss excess body   
weight. Patient   
tolerating a full liquid   
diet. Protein intake is   
not adequate for post-op   
individual. Fluid   
consumption is   
inadequate. Patient is   
not supplementing   
recommended   
vitamin/minerals.   
Reminded pt she should be   
tracking her fluid intake   
to be sure she is meeting   
goal. She reports eating   
applesauce 3 times per   
day and pudding. Advised   
tone down on the   
applesauce and start   
working on increasing   
fluid and protein intake.   
Also reminded pt that she   
should have started   
taking all vit/mins   
already. Advised pt to   
start tracking fluid   
intake to be sure she is   
meeting her goal. Advised   
to increase protein   
intake and to start   
taking calcium and B12   
daily.  
Reviewed the puree and   
soft foods. Reminded pt.   
to eat slowly, chew   
thoroughly and to try one   
new food at a time  
Malnutrition Screening  
Significant unintentional   
weight loss? n/a  
Eating less than 75% of   
usual intake for more   
than 2 weeks? n/a  
Nutrition Diagnosis:  
1.Increased protein and   
nutrition needs related   
to altered GI function as   
evidenced by pt. s/p   
gastric bypass.  
2.Food- and   
nutrition-related   
knowledge deficit related   
to lack of prior exposure   
to surgical weight loss   
information as evidenced   
by diet recall.  
Nutrition Interventions:  
1.Modify type and amount   
of food and nutrients   
within meals and snacks.  
2.Comprehensive Nutrition   
Education  
-Nutrition education   
materials: none  
Recommendations:  
1.You are not drinking   
enough fluid to meet your   
goal. Eat less applesauce   
and increase fluid intake   
to meet you goal.  
2.You are not meeting   
your protein goal.   
Continue to work   
increasing protein intake   
to meet your goal.  
3.Continue to drink your   
protein shakes to meet   
your goal of 60-70 g of   
protein per day. Begin   
measuring how much   
protein you can eat on   
the soft diet so that you   
know when to start   
weaning off of your   
protein shakes.  
4.Start no drinking 30   
min before, during the   
meal and for 30 minutes   
after the meal  
5.Continue to exercise  
6.Advance to the puree   
diet for 1 week, then   
soft food for 3 weeks  
7.Remember to eat slowly   
and chew thoroughly  
8.Try one new food at a   
time to test for any   
intolerances.  
9.Start taking your   
calcium and B12 daily  
Nutrition Monitoring and   
Evaluation:  
1-2 pounds weight loss   
per week  
Criteria: weight check,   
food recall  
Need for Follow-up: 6   
weeks post op  
Caitlyn MONTILLA, [facility]  
Bariatric Surgery   
Dietitian  
Phone: [phone]  
Fax: [phone]  
  
Patient   
Discussion/Summary  
Recommendations:  
1.You are not drinking   
enough fluid to meet your   
goal. Eat less applesauce   
and increase fluid intake   
to meet you goal.  
2.You are not meeting   
your protein goal.   
Continue to work   
increasing protein intake   
to meet your goal.  
3.Continue to drink your   
protein shakes to meet   
your goal of 60-70 g of   
protein per day. Begin   
measuring how much   
protein you can eat on   
the soft diet so that you   
know when to start   
weaning off of your   
protein shakes.  
4.Start no drinking 30   
min before, during the   
meal and for 30 minutes   
after the meal  
5.Continue to exercise  
6.Advance to the puree   
diet for 1 week, then   
soft food for 3 weeks  
7.Remember to eat slowly   
and chew thoroughly  
8.Try one new food at a   
time to test for any   
intolerances.  
9.Start taking your   
calcium and B12 daily  
Nutrition Monitoring and   
Evaluation:  
1-2 pounds weight loss   
per week  
Criteria: weight check,   
food recall  
Need for Follow-up: 6   
weeks post op  
Caitlyn MONTILLA, [facility]  
Bariatric Surgery   
Dietitian  
Phone: [phone]  
Fax: [phone]  
  
  
Signatures  
Electronically signed by   
: Caitlyn Betts RD; Dec 26   
2019 12:16PM EST (Author) Normal                                     
[employer]  
   
                                                    BASIC METABOLIC PANELon 12-2  
   
   
                      Anion gap [Moles/Vol] 13 mmol/L  Normal     10 - 20    [facility]  
   
                                        Comment on above:   Performed By: #### U  
[facility] ####  
[facility]  
[address] [place] [address]   
   
                      Calcium [Mass/Vol] 9.4 mg/dL  Normal     8.6 - 10.3 [facility]  
   
                                        Comment on above:   Performed By: #### U  
[facility] ####  
[address] [address]   
   
                      Chloride [Moles/Vol] 99 mmol/L  Normal     98 - 107   [facility]  
   
                                        Comment on above:   Performed By: #### U  
[facility] ####  
[address] [address]   
   
                      Creatinine [Mass/Vol] 0.51 mg/dL Normal     0.50 - 1.05 [facility]  
   
                                        Comment on above:   Performed By: #### U  
[facility] ####  
[address] [address]   
   
                      GFR- AM. >60        Normal     >60        [facility]  
   
                                        Comment on above:   Result Comment: CALC  
ULATIONS OF ESTIMATED GFR ARE PERFORMED  
USING THE MDRD STUDY EQUATION FOR THE  
IDMS-TRACEABLE CREATININE METHODS.  
CLIN CHEM 2007;53:766-72   
   
                                                            Performed By: #### U  
[facility] ####  
[address] [address]   
   
                      GFR-NON  AM. >60        Normal     >60        [facility]  
   
                                        Comment on above:   Performed By: #### U  
[facility] ####  
[address] [address]   
   
                      Glucose [Mass/Vol] 88 mg/dL   Normal     74 - 99    [facility]  
   
                                        Comment on above:   Performed By: #### U  
[facility] ####  
[address] [address]   
   
                                                    HCO3 (Bld)   
[Moles/Vol]     28 mmol/L       Normal          21 - 32         [facility]  
   
                                        Comment on above:   Performed By: #### U  
[facility] ####  
[address] [address]   
   
                      Potassium [Moles/Vol] 3.5 mmol/L Normal     3.5 - 5.3  [facility]  
   
                                        Comment on above:   Performed By: #### U  
[facility] ####  
[address] [address]   
   
                      Sodium [Moles/Vol] 136 mmol/L Normal     136 - 145  [facility]  
   
                                        Comment on above:   Performed By: #### U  
[facility] ####  
[address] [address]   
   
                                                    Urea nitrogen   
[Mass/Vol]      10 mg/dL        Normal          6 - 23          [facility]  
   
                                        Comment on above:   Performed By: #### U  
AMIC ####  
[address] [address]   
   
                                                    CBC AND DIFFERENTIALon   
-   
   
                                                    % AUTOMATED IMMATURE   
GRAN            0.2 %           Normal          0.0 - 0.9       [facility]  
   
                                        Comment on above:   Result Comment: Perc  
ent differential counts (%) should be   
interpreted in the  
context of the absolute cell counts (cells/L).   
   
                                                            Performed By: #### U  
AMIC ####  
[address] [address]   
   
                                                    Basophils (Bld)   
[#/Vol]         0.03 10*3/uL    Normal          0.00 - 0.10     [facility]  
   
                                        Comment on above:   Performed By: #### U  
AMIC ####  
[address] [address]   
   
                                                    Basophils/100 WBC   
(Bld)           0.5 %           Normal          0.0 - 2.0       [facility]  
   
                                        Comment on above:   Performed By: #### U  
AMIC ####  
[address] [address]   
   
                                                    Eosinophils (Bld)   
[#/Vol]         0.22 10*3/uL    Normal          0.00 - 0.70     [facility]  
   
                                        Comment on above:   Performed By: #### U  
AMIC ####  
[address] [address]   
   
                                                    Eosinophils/100 WBC   
(Bld)           3.8 %           Normal          0.0 - 6.0       [facility]  
   
                                        Comment on above:   Performed By: #### U  
AMIC ####  
[address] [address]   
   
                                                    Erythrocyte   
distribution width   
(RBC) [Ratio]   12.7 %          Normal          11.5 - 14.5     [facility]  
   
                                        Comment on above:   Performed By: #### U  
AMIC ####  
[address] [address]   
   
                                                    Hematocrit (Bld)   
[Volume fraction] 35.9 %          Low             36.0 - 46.0     [facility]  
   
                                        Comment on above:   Performed By: #### U  
AMIC ####  
[facility]  
[address] [facility] [place] [address]   
   
                                                    Hemoglobin (Bld)   
[Mass/Vol]      11.7 g/dL       Low             12.0 - 16.0     [facility]  
   
                                        Comment on above:   Performed By: #### U  
AMIC ####  
[facility]  
[address] [place]  
[place] [address]   
   
                                                    Lymphocytes (Bld)   
[#/Vol]         1.71 10*3/uL    Normal          1.20 - 4.80     [facility]  
   
                                        Comment on above:   Performed By: #### U  
AMIC ####  
[facility]  
[address]  
[place] [address]   
   
                                                    Lymphocytes/100 WBC   
(Bld)           29.6 %          Normal          13.0 - 44.0     [facility]  
   
                                        Comment on above:   Performed By: #### U  
AMIC ####  
[facility]  
[address] [place] [address], [place] [address]   
   
                      MCHC (RBC) [Mass/Vol] 32.6 g/dL  Normal     32.0 - 36.0 [facility]  
   
                                        Comment on above:   Performed By: #### U  
AMIC ####  
[facility]  
[address] [place] [address], [place] [address]   
   
                                                    MCV (RBC) [Entitic   
vol]            84 fL           Normal          80 - 100        [facility]  
   
                                        Comment on above:   Performed By: #### U  
AMIC ####  
[facility]  
[address] [facility] [place] [address]   
   
                                                    Monocytes (Bld)   
[#/Vol]         0.46 10*3/uL    Normal          0.10 - 1.00     [facility]  
   
                                        Comment on above:   Performed By: #### U  
AMIC ####  
[facility]  
[address]  
[place] [address]   
   
                                                    Monocytes/100 WBC   
(Bld)           8.0 %           Normal          2.0 - 10.0      [facility]  
   
                                        Comment on above:   Performed By: #### U  
AMIC ####  
[facility]  
[address] [place], [place] [address]  
[place] [address]   
   
                                                    Neutrophils (Bld)   
[#/Vol]         3.35 10*3/uL    Normal          1.20 - 7.70     [facility]  
   
                                        Comment on above:   Performed By: #### U  
AMIC ####  
[facility]  
[address] [place] [address]   
   
                                                    Neutrophils/100 WBC   
(Bld)           57.9 %          Normal          40.0 - 80.0     [facility]  
   
                                        Comment on above:   Performed By: #### U  
AMIC ####  
[facility]  
[address] [place] [address]   
   
                                                    Nucleated RBC/100 WBC   
(Bld) [Ratio]   0.0 /100 WBC    Normal          0.0 - 0.0       [facility]  
   
                                        Comment on above:   Performed By: #### U  
AMIC ####  
[facility]  
[address] [place] [address]   
   
                                                    Platelets (Bld)   
[#/Vol]         299 10*3/uL     Normal          150 - 450       [facility]  
   
                                        Comment on above:   Performed By: #### U  
AMIC ####  
[facility]  
[address] [place] [address]   
   
                      RBC (Bld) [#/Vol] 4.25 x10E12/L Normal     4.00 - 5.20 [facility]  
   
                                        Comment on above:   Performed By: #### U  
AMIC ####  
[facility]  
[address] [place] [address]   
   
                      WBC (Bld) [#/Vol] 5.8 10*3/uL Normal     4.4 - 11.3 [facility]  
   
                                        Comment on above:   Performed By: #### U  
AMIC ####  
[facility]  
[address] [place] [address]   
   
                                                    CT ANGIO CHEST FOR PEon    
   
                                        CT ANGIO CHEST FOR PE MRN: [id number]  
Patient Name: SHARON FISCHER  
  
STUDY:  
CT ANGIO CHEST FOR PE;   
2019 11:21 pm  
  
INDICATION:  
Chest pain, SOB, Gastric   
bypass 5 days ago.  
  
COMPARISON:  
None.  
  
ACCESSION NUMBER(S):  
[id number]  
  
ORDERING CLINICIAN:  
SELVIN BROOKS  
  
TECHNIQUE:  
Helical data acquisition   
of the chest was obtained   
with IV contrast.  
Images were reformatted   
in axial, coronal, and   
sagittal planes. Axial  
and coronal MIP   
reconstruction were also   
presented for  
interpretation. 90 mL of   
Isovue 370  
  
FINDINGS:  
POTENTIAL LIMITATIONS OF   
THE STUDY:Patient's   
involuntary motion  
artifact/beam hardening   
artifact.  
  
HEART AND VESSELS:  
The examination is   
degraded by the patient's   
involuntary motion  
artifact/beam hardening   
artifact. Areas of   
decreased attenuation in  
the pulmonary arteries   
likely represent artifact   
rather than acute  
pulmonary emboli.   
Otherwise, there is no   
obvious or gross large  
central filling defects   
to suggest pulmonary   
embolism. Smaller  
segmental/subsegmental   
pulmonary emboli cannot   
be entirely excluded.  
  
Main pulmonary artery and   
its branches are normal   
in caliber.  
  
The thoracic aorta is of   
normal course and   
caliber.  
  
Prominent heart is likely   
secondary to technique   
rather than  
cardiomegaly.  
  
No evidence of   
pericardial effusion.  
  
MEDIASTINUM AND GITA,   
LOWER NECK AND AXILLA:  
The visualized thyroid   
gland is within normal   
limits.  
  
No evidence of thoracic   
lymphadenopathy by CT   
criteria.  
  
Esophagus appears within   
normal limits as seen.  
  
LUNGS AND AIRWAYS:  
The trachea and central   
airways are patent. No   
endobronchial lesion.  
  
Small trace-small left   
pleural effusion with   
compressive atelectasis.  
  
UPPER ABDOMEN:  
The visualized   
subdiaphragmatic   
structures demonstrate   
gastric bypass  
and small hiatal hernia..  
  
CHEST WALL AND OSSEOUS   
STRUCTURES:  
There are no suspicious   
osseous lesions.   
Multilevel degenerative  
changes are present  
  
IMPRESSION:  
The examination is   
degraded by the patient's   
involuntary motion  
artifact/beam hardening   
artifact. Areas of   
decreased attenuation in  
the pulmonary arteries   
likely represent artifact   
rather than acute  
pulmonary emboli.   
Otherwise, there is no   
obvious or gross large  
central filling defects   
to suggest pulmonary   
embolism. Smaller  
segmental/subsegmental   
pulmonary emboli cannot   
be entirely excluded.  
Electronically signed by:   
KATI MULLIGAN MD          [facility]  
   
                                                    Complete Blood Count + Diffe  
rentialon 2019   
   
                                                    Basophils (Bld)   
[#/Vol]         0.03 {x10E9/L}                  See Below       [facility]  
[facility]   
303  
Work Phone:   
[phone]  
   
                                        Comment on above:   Reference Range: 0.0  
0 - 0.10   
   
                                                            Ordering Provider: SAMIRA BROOKS [address]   
   
                                                    Basophils/100 WBC   
(Bld)           0.5 %                           0.0 - 2.0       [facility]  
[facility]   
303  
Work Phone:   
[phone]  
   
                                        Comment on above:   Ordering Provider: SAMIRA BROOKS [address]   
   
                                                    Eosinophils (Bld)   
[#/Vol]         0.22 {x10E9/L}                  See Below       [facility]  
[facility]   
303  
Work Phone:   
[phone]  
   
                                        Comment on above:   Reference Range: 0.0  
0 - 0.70   
   
                                                            Ordering Provider: SAMIRA BROOKS [address]   
   
                                                    Eosinophils/100 WBC   
(Bld)           3.8 %                           0.0 - 6.0       [facility]  
[facility]   
303  
Work Phone:   
[phone]  
   
                                        Comment on above:   Ordering Provider: SAMIRA Rios369   
   
                                                    Erythrocyte   
distribution width   
(RBC) [Ratio]   12.7 %                          See Below       [facility]  
[facility]   
303  
Work Phone:   
[phone]  
   
                                        Comment on above:   Reference Range: 11.  
5 - 14.5   
   
                                                            Ordering Provider: SAMIRA BROOKS [address]   
   
                                                    Hematocrit (Bld)   
[Volume fraction]         35.9 %                    below low   
threshold                 See Below                 [facility]  
[facility]   
303  
Work Phone:   
[phone]  
   
                                        Comment on above:   Reference Range: 36.  
0 - 46.0   
   
                                                            Ordering Provider: SAMIRA BROOKS [address]   
   
                                                    Hemoglobin (Bld)   
[Mass/Vol]                11.7 g/dL                 below low   
threshold                 See Below                 [facility]  
[facility]   
303  
Work Phone:   
[phone]  
   
                                        Comment on above:   Reference Range: 12.  
0 - 16.0   
   
                                                            Ordering Provider: SAMIRA BROOKS [address]   
   
                                                    Lymphocytes (Bld)   
[#/Vol]         1.71 {x10E9/L}                  See Below       [facility]  
[facility]   
303  
Work Phone:   
[phone]  
   
                                        Comment on above:   Reference Range: 1.2  
0 - 4.80   
   
                                                            Ordering Provider: SAMIRA Rios369   
   
                                                    Lymphocytes/100 WBC   
(Bld)           29.6 %                          See Below       [facility]  
[facility]   
303  
Work Phone:   
[phone]  
   
                                        Comment on above:   Reference Range: 13.  
0 - 44.0   
   
                                                            Ordering Provider: SAMIRA BROOKS [address]   
   
                      MCHC (RBC) [Mass/Vol] 32.6 g/dL             See Below  [facility]   
303  
Work Phone:   
[phone]  
   
                                        Comment on above:   Reference Range: 32.  
0 - 36.0   
   
                                                            Ordering Provider: SAMIRA BROOKS [address]   
   
                                                    MCV (RBC) [Entitic   
vol]            84 fL                           80 - 100        [facility]  
[facility]   
303  
Work Phone:   
[phone]  
   
                                        Comment on above:   Ordering Provider: SAMIRA  
MISSAEL BROOKS [address]   
   
                                                    Monocytes (Bld)   
[#/Vol]         0.46 {x10E9/L}                  See Below       [facility]  
[facility]   
303  
Work Phone:   
[phone]  
   
                                        Comment on above:   Reference Range: 0.1  
0 - 1.00   
   
                                                            Ordering Provider: SAMIRA  
MISSAEL BROOKS [address]   
   
                                                    Monocytes/100 WBC   
(Bld)           8.0 %                           2.0 - 10.0      [facility]  
[facility]   
303  
Work Phone:   
[phone]  
   
                                        Comment on above:   Ordering Provider: SAMIRA  
MISSAEL BROOKS [address]   
   
                                                    Neutrophils (Bld)   
[#/Vol]         3.35 {x10E9/L}                  See Below       [facility]  
[facility]   
303  
Work Phone:   
[phone]  
   
                                        Comment on above:   Reference Range: 1.2  
0 - 7.70   
   
                                                            Ordering Provider: SAMIRA  
MISSAEL BROOKS [address]   
   
                                                    Neutrophils/100 WBC   
(Bld)           57.9 %                          See Below       [facility]  
[facility]   
303  
Work Phone:   
[phone]  
   
                                        Comment on above:   Reference Range: 40.  
0 - 80.0   
   
                                                            Ordering Provider: SAMIRA  
MISSAEL BROOKS [address]   
   
                                                    Platelets (Bld)   
[#/Vol]         299 {x10E9/L}                   150 - 450       [facility]  
[facility]   
303  
Work Phone:   
[phone]  
   
                                        Comment on above:   Ordering Provider: SAMIRA  
MISSAEL BROOKS [address]   
   
                      RBC (Bld) [#/Vol] 4.25 {x10E12/L}            See Below  [facility]  
[facility]   
303  
Work Phone:   
[phone]  
   
                                        Comment on above:   Reference Range: 4.0  
0 - 5.20   
   
                                                            Ordering Provider: SAMIRA  
MISSAEL BROOKS [address]   
   
                      WBC (Bld) [#/Vol] 0.0 {/100_WBC}            0.0 - 0.0  [facility]   
303  
Work Phone:   
[phone]  
   
                                        Comment on above:   Ordering Provider: SAMIRA BROOKS [address]   
   
                      WBC (Bld) [#/Vol] 5.8 {x10E9/L}            4.4 - 11.3 [facility]  
[facility]   
303  
Work Phone:   
[phone]  
   
                                        Comment on above:   Ordering Provider: SAMIRA BROOKS [address]7939   
   
                                                    Complete Blood Count   
+ Differential  0.2 %                           0.0 - 0.9       [facility]  
[facility]   
303  
Work Phone:   
[phone]  
   
                                        Comment on above:   Percent differential  
 counts (%) should be interpreted in the   
context of the absolute cell counts (cells/L).   
   
                                                            Ordering Provider: SAMIRA BROOKS [address]   
   
                                                    Metabolic Panelon 2019  
   
   
                      Anion gap [Moles/Vol] 13 mmol/L             10 - 20    [facility]   
303  
Work Phone:   
[phone]  
   
                                        Comment on above:   Ordering Provider: SAMIRA BROOKS [address]   
   
                      Calcium [Mass/Vol] 9.4 mg/dL             8.6 - 10.3 [facility]  
[facility]  
[facility]   
303  
Work Phone:   
[phone]  
   
                                        Comment on above:   Ordering Provider: SAMIRA BROOKS [address]   
   
                      Chloride [Moles/Vol] 99 mmol/L             98 - 107   [facility]  
[facility]   
303  
Work Phone:   
[phone]  
   
                                        Comment on above:   Ordering Provider: SAMIRA BROOKS [address]88[address]   
   
                      CO2 [Moles/Vol] 28 mmol/L             21 - 32    [facility]  
[facility]  
[facility]   
303  
Work Phone:   
[phone]  
   
                                        Comment on above:   Ordering Provider: SAMIRA BROOKS [address]   
   
                      Creatinine [Mass/Vol] 0.51 mg/dL            See Below  [facility]  
[facility]   
303  
Work Phone:   
[phone]  
   
                                        Comment on above:   Reference Range: 0.5  
0 - 1.05   
   
                                                            Ordering Provider: SAMIRA BROOKS [address]   
   
                      Glucose [Mass/Vol] 88 mg/dL              74 - 99    [facility]  
[facility]  
[facility]   
303  
Work Phone:   
[phone]  
   
                                        Comment on above:   Ordering Provider: SAMIRA BROOKS [address]   
   
                      Potassium [Moles/Vol] 3.5 mmol/L            3.5 - 5.3  [facility]  
[facility]   
303  
Work Phone:   
[phone]  
   
                                        Comment on above:   Ordering Provider: SAMIRA CANAS CECILIA [address]   
   
                      Sodium [Moles/Vol] 136 mmol/L            136 - 145  [facility]  
[facility]  
[facility]   
303  
Work Phone:   
[phone]  
   
                                        Comment on above:   Ordering Provider: SAMIRA  
PATRICIASHANIA CECILIA [address]   
   
                                                    Urea nitrogen   
[Mass/Vol]      10 mg/dL                        6 - 23          [facility]  
[facility]   
303  
Work Phone:   
[phone]  
[phone]  
   
                                        Comment on above:   Ordering Provider: SAMIRA CANAS CECILIA [address]   
   
                                                    Otheron 2019   
   
                                 71 1                             [facility]  
[facility]   
303  
Work Phone:   
[phone]  
   
                                        Comment on above:   Ordering Provider: SAMIRA canas Cecilia [address]   
   
                                 4 1                              [facility]  
[facility]   
303  
Work Phone:   
[phone]  
   
                                        Comment on above:   Ordering Provider: SAMIRA  
missael Brooks [address]   
   
                                 120 1                            [facility]  
[facility]   
303  
Work Phone:   
[phone]  
   
                                        Comment on above:   Ordering Provider: SAMIRA canas Cecilia [address]   
   
                                 81 1                             [facility]  
[facility]   
303  
Work Phone:   
[phone]  
   
                                        Comment on above:   Ordering Provider: SAMIRA canas Cecilia [address]   
   
                                 383 1                            [facility]  
[facility]   
303  
Work Phone:   
[phone]  
   
                                        Comment on above:   Ordering Provider: SAMIRA canas Cecilia [address]   
   
                                 417 1                            [facility]  
[facility]   
303  
Work Phone:   
[phone]  
   
                                        Comment on above:   Ordering Provider: SAMIRA canas Cecilia [address]   
   
                                 21 1                             [facility]  
[facility]   
303  
Work Phone:   
[phone]  
[phone]  
   
                                        Comment on above:   Ordering Provider: SAMIRA canas Cecilia [address]   
   
                                 9 1                              [facility]  
[facility]   
303  
Work Phone:   
[phone]  
   
                                        Comment on above:   Ordering Provider: SAMIRA  
missael Brooks [address]   
   
                                 11 1                             [facility]  
[facility]   
303  
Work Phone:   
[phone]  
   
                                        Comment on above:   Ordering Provider: SAMIRA  
missael Brooks [address]   
   
                                 254 1                            [facility]  
[facility]   
303  
Work Phone:   
[phone]  
   
                                        Comment on above:   Ordering Provider: SAMIRA Gayin [address]   
   
                                 446 1                            [facility]  
[facility]   
303  
Work Phone:   
[phone]  
   
                                        Comment on above:   Ordering Provider: SAMIRA Gayin [address]   
   
                                 405 1                            [facility]  
[facility]   
303  
Work Phone:   
[phone]  
   
                                        Comment on above:   Ordering Provider: SAMIRA Brooks [address]   
   
                                 Sinus rhythm                       [facility]  
[facility]   
303  
Work Phone:   
[phone]  
   
                                        Comment on above:   Ordering Provider: SAMIRA Brooks [address]   
   
                                                            [email or web address]  
[email or web address]  
[email or web address]  
?[email or web address]?P  
[id number]                                          [facility]  
[facility]   
303  
Work Phone:   
[phone]  
   
                                        Comment on above:   Ordering Provider: SAMIRA Brooks [address]   
   
                                 >60                   >60        [facility]  
[facility]   
303  
Work Phone:   
[phone]  
   
                                        Comment on above:   CALCULATIONS OF SHAGGY  
MATED GFR ARE PERFORMED USING THE MDRD   
STUDY   
EQUATION FOR THE IDMS-TRACEABLE CREATININE METHODS. CLIN CHEM   
2007;53:766-72   
   
                                                            Ordering Provider: SAMIRA GAYIN [id number]   
   
                                                    Provider Note - ED v2on    
   
                                        Provider Note - ED v2 Provider Note - ED  
 v2:  
Chart Review:  
HISTORY OF PRESENTING   
ILLNESS  
SHARON is a 39 year old   
Female and was seen by me   
at 23-Dec-2019 21:36 for   
a  
chief complaint of   
shortness of breath (pt   
had gastric bypass    
and now  
having pain and SOB. pain   
and tingling down her   
right arm)(1).  
  
Additional Details:   
Patient presenting   
secondary chest pain   
shortness of  
breath. Patient has   
underlying history of   
having gastric bypass   
surgery on  
Wednesday of last week.   
Patient states that today   
she started to develop   
some  
chest pain and shortness   
of breath. Patient states   
that the pain in her   
chest  
is located across   
bilaterally on her chest   
and associated with pain   
with deep  
inspiration, shortness of   
breath, and some   
radiation down to her   
right arm. She  
denies any   
lightheadedness or   
syncope. Patient's   
abdominal pain from her  
surgery is at its   
baseline and unchanged.   
Patient also states that   
she has some  
pain in her shoulders   
bilaterally. She denies   
any nausea vomiting   
diarrhea. She  
denies any difficulty   
swallowing foods or   
liquids. She denies any   
hemoptysis.  
She denies any fevers.   
Review of systems   
otherwise negative.  
  
Triage Information: Most   
recent Vital Sign Value   
Date  
Temp (F): 98.2 2019   
20:53  
Temp (C): 36.8 2019   
20:53  
Heart Rate (beats/min):   
88 2019 20:53  
Respirations   
(breaths/min): 16   
2019 20:53  
SpO2 (%): 99 2019   
20:53  
BP Systolic (mm Hg): 116   
2019 20:53  
BP Diastolic (mm Hg): 69   
2019 20:53  
  
  
  
  
PAST MEDICAL HISTORY  
ATTESTATION: I have   
reviewed and confirmed   
nurse's/medic's notes for   
patient's  
medications, allergies,   
medical history, and   
surgical history  
  
ALLERGIES/INTOLERANCES:   
Allergy  
Allergen: sulfa drugs  
Type: Drug Category  
Reaction: Hives/Urticaria   
(Severe)  
  
HEALTH HISTORY: No   
documented data.  
  
OUTPATIENT MEDICATIONS:   
Home Medications Review   
Status for   
Reconciliation: N/A  
Med Status: No Current   
Medications  
  
Drug Name: Multiple   
Vitamins oral tablet  
Instructions: 1 tab(s)   
orally once a day  
  
SIGNIFICANT EVENTS:   
Clinical Events  
Description:Surgical   
Procedure  
Additional Notes:1.   
Laparoscopic Gastric   
Bypass;2. Hiatal Hernia   
Repair ;3.  
Upper Endoscopy ;4.   
Bilateral TAP blocks ;5.  
  
  
Past Surgical History  
Description:gastric   
bypass  
  
OB/GYN: Is Pregnant:   
no(1) Is Breastfeeding:   
no(1)  
  
  
  
REVIEW OF SYSTEMS  
CONSTITUTIONAL: Negative   
for: fever  
EYES: Negative for:   
vision changes  
ENMT  
Ears: Negative for: pain  
Nose: Negative for:   
discharge  
Throat/Neck: Negative   
for: throat pain and neck   
stiffness  
CARDIOVASCULAR: POSITIVE   
for: chest pain  
RESPIRATORY: POSITIVE   
for: dyspnea Negative   
for: cough  
GASTROINTESTINAL:   
Negative for: abdominal   
pain, diarrhea, nausea   
and vomiting;  
GENITOURINARY: Negative   
for: dysuria and   
hematuria;  
MUSCULOSKELETAL: Negative   
for: pain  
NEUROLOGICAL: Negative   
for: altered mental   
status and headache;  
PSYCHIATRIC: Negative   
for: depression  
ENDOCRINE: Negative for:   
change in weight  
HEME/LYMPH: Negative for:   
easy bleeding and easy   
bruising  
ALLERGIC/IMMUNOLOGIC:   
Negative for:   
immunosuppressive   
disorder;  
All other systems   
reviewed and are negative  
  
  
  
PHYSICAL EXAM  
CONSTITUTIONAL: Well   
appearing, well   
nourished, awake, alert,   
oriented to  
person, place,   
time/situation and in no   
apparent distress.  
  
HENMT: Head is   
normocephalic/atraumatic.   
Airways patent, mucous   
membranes are  
moist. Neck is supple   
with no lymphadenopathy   
noted.  
  
EYES: Clear bilaterally,   
pupils equal, round and   
reactive to light. EOMI  
  
CARDIOVASCULAR: Regular   
rate and rhythm, no   
murmurs. 2+ radial pulses  
bilaterally symmetric.  
  
RESPIRATORY: Breath   
sounds clear and equal   
bilaterally. Chest is   
nontender  
  
GASTROINTESTINAL:   
Abdominal citizens appear   
well healing, no evidence   
of  
purulence or erythema.   
Abdomen is diffusely   
tender without guarding.  
  
MUSCULOSKELETAL:   
Extremities are   
atraumatic with active   
full range of motion.  
No evidence of asymmetric   
lung swelling.  
  
NEUROLOGICAL: Alert and   
oriented, no focal   
deficits, no motor or   
sensory  
deficits.  
  
SKIN: Skin normal color   
for race, warm, dry and   
intact. No evidence of   
trauma.  
PSYCHIATRIC: Alert and   
oriented to person,   
place, time/situation.   
normal mood  
and affect. No apparent   
risk to self or others.  
  
HEME/LYMPH: No adenopathy   
or splenomegaly. No   
cervical, supraclavicular   
or  
inguinal lymphadenopathy.  
  
  
  
  
  
  
  
  
RESULTS/VITAL SIGNS  
RESULTS:  
Recent Lab Results:  
  
I have reviewed these   
laboratory results:  
  
Complete Blood Count +   
Differential 23-Dec-2019   
22:13:00  
  
ResultValue  
White Blood Cell Count   
5.8  
Nucleated Erythrocyte   
Count 0.0  
Red Blood Cell Count 4.25  
HGB 11.7 L  
HCT 35.9 L  
MCV 84  
MCHC 32.6  
  
RDW-CV 12.7  
Neutrophil % 57.9  
Immature Granulocytes %   
0.2  
Lymphocyte % 29.6  
Monocyte % 8.0  
Eosinophil % 3.8  
Basophil % 0.5  
Neutrophil Count 3.35  
Lymphocyte Count 1.71  
Monocyte Count 0.46  
Eosinophil Count 0.22  
Basophil Count 0.03  
  
Basic Metabolic Panel   
23-Dec-2019 22:13:00  
  
ResultValue  
Glucose, Serum 88  
  
K 3.5  
CL 99  
Bicarbonate, Serum 28  
Anion Gap, Serum 13  
BUN 10  
CREAT 0.51  
GFR-Non    
>60  
GFR-African American >60  
Calcium, Serum 9.4  
  
Troponin I, Serum   
23-Dec-2019 22:13:00  
  
ResultValue  
Troponin I, Serum <0.02  
  
Radiology Results:  
  
Impression:  
  
The examination is   
degraded by the patient's   
involuntary motion  
artifact/beam hardening   
artifact. Areas of   
decreased attenuation in  
the pulmonary arteries   
likely represent artifact   
rather than acute  
pulmonary emboli.   
Otherwise, there is no   
obvious or gross large  
central filling defects   
to suggest pulmonary   
embolism. Smaller  
segmental/subsegmental   
pulmonary emboli cannot   
be entirely excluded.  
  
TH CT Angio Chest for PE   
[Dec 24 2019 12:12AM]  
  
VITAL SIGNS:  
  
T PRBP SpO2O2(LPM) %FiO2   
Method  
23-Dec-2019   
22:52:[id number]/51 99   
room air, no respiratory  
support  
23-Dec-2019   
20:53:[id number]/69   
99 room air, no   
respiratory  
support  
  
  
EKG INTERPRETATION:  
Impression: Sinus rhythm   
of 71 with diffuse T-wave   
flattening that appears   
new  
in onset from a prior EKG   
on 3 December but is   
onspecific. Isoelectric   
ST  
segments.  
  
  
  
MEDICAL DECISION   
MAKING/ED COURSE  
MDM/ED COURSE:  
Patient presented   
secondary to shortness of   
breath chest pain in   
setting of  
recent surgery. Due to   
the patient's recent   
surgery I felt her to be   
at higher  
risk for pulmonary   
embolism so workup was   
obtained. EKG shows some   
diffuse  
T-wave flattening, but no   
evidence of right   
ventricular strain, S1, q   
3, T3  
morphology or any   
ischemia. CBC chemistry   
and troponin were found   
to be  
unremarkable. CT   
angiogram of the chest   
per radiology shows some   
motion and  
beam hardening artifact,   
but likely no evidence of   
large central pulmonary  
emboli but difficult to   
assess the subsegmental   
areas of the lungs.   
Patient  
throughout her stay in   
the emergency department   
has had pulse ox in the   
98%  
range and has always had   
a normal stable heart   
rate in the 70s or 80s   
and has  
not been tachycardic. At   
this point I believe the   
patient likely to not   
have  
pulmonary embolus. She   
describes her pain as   
being in her shoulders   
bilaterally  
and potentially it is   
associated with a mild   
amount of   
pneumoperitoneum from  
her surgery. Patient was   
informed that should she   
have worsening of her  
symptoms she should come   
back for further   
evaluation. All questions   
were  
answered and patient was   
discharged.  
  
  
  
  
  
  
  
  
CLINICAL IMPRESSION  
Diagnosis/Annotation: ED   
Dx  
Name:Chest pain  
Code:R07.9  
  
Dispostion: discharged  
  
Type: home  
  
  
  
ATTESTATION  
  
  
CRITICAL CARE TIME  
Is this a critically ill   
patient: no  
  
  
  
  
  
Electronic Signatures:  
Selvin Brooks)   
(Signed 24-Dec-2019   
00:38)  
Authored: Provider Note -   
ED v2  
  
  
Last Updated: 24-Dec-2019   
00:38 by Selvin Brooks (MD)  
  
References:  
1. Data Referenced From   
 Triage - ED  23-Dec-2019   
20:53               Normal                                  [facility]  
   
                                                    TH CT Angio Chest For PEon 1  
2019   
   
                                                    TH CT Angio Chest For   
PE                                      Interpreted by: KATI   
ONOJUVWEVWO   
CETVRI92/24/19 00:10MRN:   
[id number] Name:   
SHARON IFSCHER STUDY:CT   
ANGIO CHEST FOR PE;   
2019 11:21 pm   
INDICATION:Chest pain,   
SOB, Gastric bypass 5   
days ago.   
COMPARISON:None.   
ACCESSION   
NUMBER(S):[id number]   
ORDERING   
CLINICIAN:SELVIN BROOKS   
TECHNIQUE:Helical data   
acquisition of the chest   
was obtained with IV   
contrast.Images were   
reformatted in axial,   
coronal, and sagittal   
planes. Axialand coronal   
MIP reconstruction were   
also presented   
forinterpretation. 90 mL   
of Isovue 370   
FINDINGS:POTENTIAL   
LIMITATIONS OF THE   
STUDY:Patient's   
involuntary   
motionartifact/beam   
hardening artifact. HEART   
AND VESSELS:The   
examination is degraded   
by the patient's   
involuntary   
motionartifact/beam   
hardening artifact. Areas   
of decreased attenuation   
inthe pulmonary arteries   
likely represent artifact   
rather than   
acutepulmonary emboli.   
Otherwise, there is no   
obvious or gross   
largecentral filling   
defects to suggest   
pulmonary embolism.   
Smallersegmental/subsegme  
ntal pulmonary emboli   
cannot be entirely   
excluded. Main pulmonary   
artery and its branches   
are normal in caliber.   
The thoracic aorta is of   
normal course and   
caliber. Prominent heart   
is likely secondary to   
technique rather   
thancardiomegaly. No   
evidence of pericardial   
effusion. MEDIASTINUM AND   
GITA, LOWER NECK AND   
AXILLA:The visualized   
thyroid gland is within   
normal limits. No   
evidence of thoracic   
lymphadenopathy by CT   
criteria. Esophagus   
appears within normal   
limits as seen. LUNGS AND   
AIRWAYS:The trachea and   
central airways are   
patent. No endobronchial   
lesion. Small trace-small   
left pleural effusion   
with compressive   
atelectasis. UPPER   
ABDOMEN:The visualized   
subdiaphragmatic   
structures demonstrate   
gastric bypassand small   
hiatal hernia.. CHEST   
WALL AND OSSEOUS   
STRUCTURES:There are no   
suspicious osseous   
lesions. Multilevel   
degenerativechanges are   
present IMPRESSION:The   
examination is degraded   
by the patient's   
involuntary   
motionartifact/beam   
hardening artifact. Areas   
of decreased attenuation   
inthe pulmonary arteries   
likely represent artifact   
rather than   
acutepulmonary emboli.   
Otherwise, there is no   
obvious or gross   
largecentral filling   
defects to suggest   
pulmonary embolism.   
Smallersegmental/subsegme  
ntal pulmonary emboli   
cannot be entirely   
excluded.Electronically   
signed by: KATI MULLIGAN 19 00:10 Normal                                  [facility]  
VIEO   
[address]  
Work Phone:   
[phone]  
   
                                        Comment on above:   Ordering Provider: SAMIRA BROOKS [id number]   
   
                                                    TROPONIN Ion 2019   
   
                                                    Troponin I.cardiac   
[Mass/Vol]      ng/mL           Normal          0.00 - 0.03     [facility]  
   
                                        Comment on above:   Result Comment: LESS  
 THAN 0.04 NG/ML: NEGATIVE  
REPEAT TESTING IN THREE TO SIX HOURS  
IF CLINICALLY INDICATED.  
0.04 - 0.5 NG/ML: CONSISTENT WITH POSSIBLE  
CARDIAC DAMAGE AND POSSIBLE INCREASED  
CLINICAL RISK.  
SERIAL MEASUREMENTS MAY HELP ASSESS EXTENT OF  
MYOCARDIAL DAMAGE.  
>0.5 NG/ML: CONSISTENT WITH CARDIAC DAMAGE,  
INCREASED CLINICAL RISK AND MYOCARDIAL  
INFARCTION. SERIAL MEASUREMENTS MAY HELP  
ASSESS EXTENT OF MYOCARDIAL DAMAGE.  
.  
Note: Troponin I testing is performed using different  
testing methodology at [facility] than at other  
[facility]. Direct result comparisons should only  
be made within the same method.   
   
                                                            Performed By: #### U  
[facility] ####  
[facility]  
[address] [place] [address]   
   
                                                    Troponin I, Serumon 20   
   
                                                    Troponin I.cardiac   
[Mass/Vol]      ng/mL                           See Below       [facility]  
[address]   
[address]  
Work Phone:   
[phone]  
   
                                        Comment on above:   Reference Range: 0.0  
0 - 0.03LESS THAN 0.04 NG/ML:   
NEGATIVEREPEAT   
TESTING IN THREE TO SIX HOURSIF CLINICALLY INDICATED.0.04 - 0.5   
NG/ML: CONSISTENT WITH POSSIBLECARDIAC DAMAGE AND POSSIBLE   
INCREASEDCLINICAL RISK.SERIAL MEASUREMENTS MAY HELP ASSESS   
EXTENT OFMYOCARDIAL DAMAGE.>0.5 NG/ML: CONSISTENT WITH CARDIAC   
DAMAGE,INCREASED CLINICAL RISK AND MYOCARDIALINFARCTION. SERIAL   
MEASUREMENTS MAY HELPASSESS EXTENT OF MYOCARDIAL DAMAGE..Note:   
Troponin I testing is performed using different testing   
methodology at [facility] than at other [facility]. Direct result comparisons should only be made within   
the same method.   
   
                                                            Ordering Provider: SAMIRA BROOKS [id number]   
   
                                                    Triage - EDon 2019   
   
                                        Triage - ED         Quick Triage:  
Are You Pregnantno  
Have You Given Birth In   
The Last 6 Weeksno  
Are You Currently   
Breastfeedingno  
  
Chart Review:  
CHIEF COMPLAINT  
  
SHARON FISCHER is a   
Female patient with a   
chief complaint of   
shortness of  
breath (pt had gastric   
bypass  and now   
having pain and SOB. pain   
and  
tingling down her right   
arm).  
Triage Date/Time:   
23-Dec-2019 20:53  
Pain Rating (0-10): 7 =   
Severe  
Pain location: shoulder   
pain  
Vital Signs:  
Temperature: 98.2F (   
36.8C) taken temporal  
Blood Pressure: 116/69   
Mean:  
Heart Rate: 88  
Respiratory Rate: 16  
Pulse Oximetry: 99% on   
room air, no respiratory   
support. Height: 5 feet   
3.00  
inches. 160.0 CM  
Weight: 258.0 pounds.   
Calculated 117.0 kg.   
(stated)  
Calculated BMI (kg/m2):   
45.703 Calculated BSA   
(m2) 2.28  
  
Alexandru Coma Scale:  
Best Eye Response: (E4)   
spontaneous  
Best Motor Response: (M6)   
obeys commands  
Best Verbal Response:   
(V5) oriented  
Essex Score: 15  
  
  
Allergies: yes  
Patient has homicidal   
thoughts: no  
MAITE: 3  
  
Symptoms Are Negative   
For:  
body aches, chest pain,   
chills, congestion,   
cough, diaphoresis,   
dyspnea, fever,  
headache and malaise.  
  
  
Risk Screens  
Suicide Risk Screen  
  
In the Past Month: Have   
you wished you were dead   
or wished you could go to  
sleep and not wake up no  
In the Past Month: Have   
you had any actual   
thoughts of killing   
yourself no  
In Your Lifetime: Have   
you ever done anything,   
started to do anything,   
or  
prepared to do anything   
to end your life no  
  
  
  
Lutz Fall Scale   
Screening  
Has the patient fallen   
before (or is the patient   
in the ED as a result of   
a  
fall) has not had a fall  
Does the patient have an   
impaired gait does not   
have impaired gait  
Is the patient   
cognitively impaired not   
cognitively impaired  
  
  
Interventions:  
Lutz Fall Interventions:   
*patient oriented to   
surroundings and call   
system,  
* patient/family falls   
education completed  
and documented,  
*patients fall status   
communicated  
during bedside handoff,  
*whiteboard updated,  
*mode of toileting   
discussed with patient,  
*bed in low position with   
brakes locked,  
*call light in reach,  
* non-skid footwear  
  
  
PAIN  
  
Pain Scale Used: KENTRELL  
Pain Rating (0-10): 7 =   
Severe  
  
  
ARRIVAL INFORMATION  
  
Means of Arrival:   
Ambulatory Mode of   
Arrival: private vehicle   
Arrival From:  
home  
  
  
PRIMARY ASSESSMENT  
  
SHARON FISCHER's primary   
assessment is Within   
Normal Limits. The airway   
is open  
and patent. Breathing   
spontaneous and unlabored   
with clear breath sounds  
bilaterally. Circulation   
is normal with good   
peripheral pulses. Skin   
is warm  
and dry and color is   
normal for race.  
  
  
PAST MEDICAL HISTORY  
  
Immunization History:  
Last Known Tetanus   
Immunization: Unknown  
  
  
  
TRAVEL HISTORY  
  
Travel Exposure History:   
NO travel to   
International locations   
in the past 30  
days  
  
  
  
  
  
  
Past Medical History:  
Past Medical History   
Reviewedyes  
  
gastric bypass: Past   
Surgical History, Active  
  
  
Electronic Signatures:  
Lorraine Mock (RN) (Signed   
23-Dec-2019 20:57)  
Authored: Triage, Past   
Medical History  
  
  
Last Updated: 23-Dec-2019   
20:57 by Lorraine Mock   
(RN)                Normal                                  [facility]  
   
                                                    BASIC METABOLIC PANELon 12-2  
   
   
                      Anion gap [Moles/Vol] 13 mmol/L  Normal     10 - 20    [facility]  
   
                                        Comment on above:   Performed By: #### C  
BCDF ####  
[address] [address]   
   
                      Calcium [Mass/Vol] 8.9 mg/dL  Normal     8.6 - 10.3 [facility]  
   
                                        Comment on above:   Performed By: #### C  
BCDF ####  
[address] [address]   
   
                      Chloride [Moles/Vol] 106 mmol/L Normal     98 - 107   [facility]  
   
                                        Comment on above:   Performed By: #### C  
BCDF ####  
[address] [address]   
   
                      Creatinine [Mass/Vol] 0.61 mg/dL Normal     0.50 - 1.05 [facility]  
   
                                        Comment on above:   Performed By: #### C  
BCDF ####  
[address] [address]   
   
                      GFR- AM. >60        Normal     >60        [facility]  
   
                                        Comment on above:   Result Comment: CALC  
ULATIONS OF ESTIMATED GFR ARE PERFORMED  
USING THE MDRD STUDY EQUATION FOR THE  
IDMS-TRACEABLE CREATININE METHODS.  
CLIN CHEM 2007;53:766-72   
   
                                                            Performed By: #### C  
BCDF ####  
[facility]  
[address] [place] [id number]   
   
                      GFR-NON  AM. >60        Normal     >60        [facility]  
   
                                        Comment on above:   Performed By: #### C  
BCDF ####  
[address] [place] [address]   
   
                      Glucose [Mass/Vol] 72 mg/dL   Low        74 - 99    [facility]  
   
                                        Comment on above:   Performed By: #### C  
BCDF ####  
[address] [place] [address]   
   
                                                    HCO3 (Bld)   
[Moles/Vol]     24 mmol/L       Normal          21 - 32         [facility]  
   
                                        Comment on above:   Performed By: #### C  
BCDF ####  
[address] [place] [address]   
   
                      Potassium [Moles/Vol] 3.8 mmol/L Normal     3.5 - 5.3  [facility]  
   
                                        Comment on above:   Performed By: #### C  
BCDF ####  
[address] [place] [address]   
   
                      Sodium [Moles/Vol] 139 mmol/L Normal     136 - 145  [facility]  
   
                                        Comment on above:   Performed By: #### C  
BCDF ####  
[address] [place] [address]   
   
                                                    Urea nitrogen   
[Mass/Vol]      13 mg/dL        Normal          6 - 23          [facility]  
   
                                        Comment on above:   Performed By: #### C  
BCDF ####  
[address] [place] [address]   
   
                                                    CBCon 2019   
   
                                                    Erythrocyte   
distribution width   
(RBC) [Ratio]   12.7 %          Normal          11.5 - 14.5     [facility]  
   
                                        Comment on above:   Performed By: #### C  
BCDF ####  
[address] [place] [address]   
   
                                                    Hematocrit (Bld)   
[Volume fraction] 34.4 %          Low             36.0 - 46.0     [facility]  
   
                                        Comment on above:   Performed By: #### C  
BCDF ####  
[address] [place] [address]   
   
                                                    Hemoglobin (Bld)   
[Mass/Vol]      10.9 g/dL       Low             12.0 - 16.0     [facility]  
   
                                        Comment on above:   Performed By: #### C  
BCDF ####  
[address] [place] [address]   
   
                      MCHC (RBC) [Mass/Vol] 31.7 g/dL  Low        32.0 - 36.0 [facility]  
   
                                        Comment on above:   Performed By: #### C  
BCDF ####  
[facility]  
[address] [place] [address]   
   
                                                    MCV (RBC) [Entitic   
vol]            86 fL           Normal          80 - 100        [facility]  
   
                                        Comment on above:   Performed By: #### C  
BCDF ####  
[address] [address]   
   
                                                    Nucleated RBC/100 WBC   
(Bld) [Ratio]   0.0 /100 WBC    Normal          0.0 - 0.0       [facility]  
   
                                        Comment on above:   Performed By: #### C  
BCDF ####  
[address] [address]   
   
                                                    Platelets (Bld)   
[#/Vol]         220 10*3/uL     Normal          150 - 450       [facility]  
   
                                        Comment on above:   Performed By: #### C  
BCDF ####  
[address] [address]   
   
                      RBC (Bld) [#/Vol] 4.00 x10E12/L Normal     4.00 - 5.20 [facility]  
   
                                        Comment on above:   Performed By: #### C  
BCDF ####  
[address] [address]   
   
                      WBC (Bld) [#/Vol] 4.9 10*3/uL Normal     4.4 - 11.3 [facility]  
   
                                        Comment on above:   Performed By: #### C  
BCDF ####  
[address] [address]   
   
                                                    Discharge [id number]   
019   
   
                                        Discharge Profile2  Discharge Orders:  
Anticipated Discharge   
Date:  
Anticipated Discharge   
Date20-Dec-2019  
  
Problem List:  
Additional Dx:  
Morbid or severe obesity   
with alveolar   
hypoventilation: Catalog   
Name: Morbid  
(severe) obesity with   
alveolar hypoventilation  
  
Hospital Providers:  
Provider RoleProvider   
Name  
eLydi Anglin  
  
Activity:  
activity as tolerated.  
  
May shower.  
  
May not return to   
school/work until   
follow-up visit with   
Instructions:  
  
May not drive while   
taking narcotics.  
  
No pushing, pulling, or   
lifting objects greater   
than 20 pounds for 6   
week(s).  
  
Diet:  
DietBariatric full liquid   
diet for one week, then   
puree diet, then soft   
diet  
Encourage FluidsDrink 64   
ounces daily  
  
Call Provider If   
(Homegoing Patients):  
Breathing faster than   
normal.  
  
Breathing harder than   
normal or having   
retractions.  
  
Temperature is greater   
than 102 degrees.  
  
Drinking less than   
normal.  
  
Vomiting (throwing up)   
and not able to eat or   
drink for 12 hours.  
  
3 or more loose, watery   
bowel movements in 24   
hours (diarrhea).  
  
Any new concerning   
symptoms.  
  
Provider FINAL REVIEW of   
Orders:  
Final Review:  
Final Review of   
Medication Reconciliation   
and Orders Completedby   
Physician  
Reviewing ProviderSjessica Barbosa MD at 20-Dec-2019   
14:43:38  
  
  
Electronic Signatures:  
Bridger Barbosa)   
(Signed 20-Dec-2019   
14:43)  
Authored: Discharge   
Orders, Provider FINAL   
REVIEW of Orders, Gold   
Form -  
 Summary  
  
  
Last Updated: 20-Dec-2019   
14:43 by Bridger Barbosa)                Normal                                  [facility]  
   
                                                    Hematologyon 2019   
   
                                                    Hematocrit (Bld)   
[Volume fraction]         34.4 %                    below low   
threshold                 See Below                 [facility]  
[facility]   
303  
Work Phone:   
[phone]  
   
                                        Comment on above:   Reference Range: 36.  
0 - 46.0   
   
                                                    Hemoglobin (Bld)   
[Mass/Vol]                10.9 g/dL                 below low   
threshold                 See Below                 [facility]  
[facility]   
303  
Work Phone:   
[phone]  
   
                                        Comment on above:   Reference Range: 12.  
0 - 16.0   
   
                                                    MCV (RBC) [Entitic   
vol]            86 fL                           80 - 100        [facility]  
[facility]   
303  
Work Phone:   
[phone]  
   
                                                    Platelets (Bld)   
[#/Vol]         220 {x10E9/L}                   150 - 450       [facility]  
[facility]   
303  
Work Phone:   
[phone]  
   
                      RBC (Bld) [#/Vol] 4.00 {x10E12/L}            See Below  [facility]  
[facility]   
303  
Work Phone:   
[phone]  
   
                                        Comment on above:   Reference Range: 4.0  
0 - 5.20   
   
                      WBC (Bld) [#/Vol] 0.0 {/100_WBC}            0.0 - 0.0  [facility]  
[facility]   
303  
Work Phone:   
[phone]  
   
                      WBC (Bld) [#/Vol] 4.9 {x10E9/L}            4.4 - 11.3 [facility]  
[facility]   
303  
Work Phone:   
[phone]  
   
                                                    Metabolic Panelon 2019  
   
   
                      Anion gap [Moles/Vol] 13 mmol/L             10 - 20    [facility]  
[facility]   
303  
Work Phone:   
[phone]  
   
                      Calcium [Mass/Vol] 8.9 mg/dL             8.6 - 10.3 [facility]  
[facility]  
[facility]   
303  
Work Phone:   
[phone]  
   
                      Chloride [Moles/Vol] 106 mmol/L            98 - 107   [facility]  
[facility]   
303  
Work Phone:   
[phone]  
   
                      CO2 [Moles/Vol] 24 mmol/L             21 - 32    [facility]  
y-P  
[facility]   
303  
Work Phone:   
[phone]  
   
                      Creatinine [Mass/Vol] 0.61 mg/dL            See Below  [facility]  
[facility]   
303  
Work Phone:   
[phone]  
   
                                        Comment on above:   Reference Range: 0.5  
0 - 1.05   
   
                                Glucose [Mass/Vol] 72 mg/dL        below low   
threshold                 74 - 99                   [facility]  
[facility]   
303  
Work Phone:   
[phone]  
   
                      Potassium [Moles/Vol] 3.8 mmol/L            3.5 - 5.3  [facility]  
[facility]   
303  
Work Phone:   
[phone]  
   
                      Sodium [Moles/Vol] 139 mmol/L            136 - 145  [facility]  
[facility]  
[facility]   
303  
Work Phone:   
[phone]  
   
                                                    Urea nitrogen   
[Mass/Vol]      13 mg/dL                        6 - 23          [facility]  
[facility]   
303  
Work Phone:   
[phone]  
   
                                                    Otheron 2019   
   
                                                    Erythrocyte   
distribution width   
(RBC) [Ratio]   12.7 %                          See Below       [facility]  
[facility]   
303  
Work Phone:   
[phone]  
   
                                        Comment on above:   Reference Range: 11.  
5 - 14.5   
   
                                MCHC (RBC) [Mass/Vol] 31.7 g/dL       below low   
threshold                 See Below                 [facility]  
[facility]   
303  
Work Phone:   
[phone]  
   
                                        Comment on above:   Reference Range: 32.  
0 - 36.0   
   
                                 >60                   >60        [facility]  
[facility]   
303  
Work Phone:   
[phone]  
   
                                        Comment on above:   CALCULATIONS OF SHAGGY  
MATED GFR ARE PERFORMED USING THE MDRD   
STUDY   
EQUATION FOR THE IDMS-TRACEABLE CREATININE METHODS. CLIN CHEM   
2007;53:766-72   
   
                                                    CBC AND DIFFERENTIALon    
   
                                                    % AUTOMATED IMMATURE   
GRAN            0.3 %           Normal          0.0 - 0.9       [facility]  
   
                                        Comment on above:   Result Comment: Perc  
ent differential counts (%) should be   
interpreted in the  
context of the absolute cell counts (cells/L).   
   
                                                            Performed By: #### C  
BCDF ####  
[address] [address]   
   
                                                    Basophils (Bld)   
[#/Vol]         0.01 10*3/uL    Normal          0.00 - 0.10     [facility]  
   
                                        Comment on above:   Performed By: #### C  
BCDF ####  
[address] [address]   
   
                                                    Basophils/100 WBC   
(Bld)           0.2 %           Normal          0.0 - 2.0       [facility]  
   
                                        Comment on above:   Performed By: #### C  
BCDF ####  
[address] [address]   
   
                                                    Eosinophils (Bld)   
[#/Vol]         0.04 10*3/uL    Normal          0.00 - 0.70     [facility]  
   
                                        Comment on above:   Performed By: #### C  
BCDF ####  
[address] [address]   
   
                                                    Eosinophils/100 WBC   
(Bld)           0.6 %           Normal          0.0 - 6.0       [facility]  
   
                                        Comment on above:   Performed By: #### C  
BCDF ####  
[address] [address]   
   
                                                    Erythrocyte   
distribution width   
(RBC) [Ratio]   12.5 %          Normal          11.5 - 14.5     [facility]  
   
                                        Comment on above:   Performed By: #### C  
BCDF ####  
[address] [address]   
   
                                                    Hematocrit (Bld)   
[Volume fraction] 35.8 %          Low             36.0 - 46.0     [facility]  
   
                                        Comment on above:   Performed By: #### C  
BCDF ####  
[address] [address]   
   
                                                    Hemoglobin (Bld)   
[Mass/Vol]      11.5 g/dL       Low             12.0 - 16.0     [facility]  
   
                                        Comment on above:   Performed By: #### C  
BCDF ####  
[address] [address]   
   
                                                    Lymphocytes (Bld)   
[#/Vol]         1.74 10*3/uL    Normal          1.20 - 4.80     [facility]  
   
                                        Comment on above:   Performed By: #### C  
BCDF ####  
[address] [address]   
   
                                                    Lymphocytes/100 WBC   
(Bld)           27.9 %          Normal          13.0 - 44.0     [facility]  
   
                                        Comment on above:   Performed By: #### C  
BCDF ####  
[address] [address]   
   
                      MCHC (RBC) [Mass/Vol] 32.1 g/dL  Normal     32.0 - 36.0 [facility]  
   
                                        Comment on above:   Performed By: #### C  
BCDF ####  
[address] [address]   
   
                                                    MCV (RBC) [Entitic   
vol]            86 fL           Normal          80 - 100        [facility]  
   
                                        Comment on above:   Performed By: #### C  
BCDF ####  
[address] [address]   
   
                                                    Monocytes (Bld)   
[#/Vol]         0.60 10*3/uL    Normal          0.10 - 1.00     [facility]  
   
                                        Comment on above:   Performed By: #### C  
BCDF ####  
[address] [address]   
   
                                                    Monocytes/100 WBC   
(Bld)           9.6 %           Normal          2.0 - 10.0      [facility]  
   
                                        Comment on above:   Performed By: #### C  
BCDF ####  
[address] [address]   
   
                                                    Neutrophils (Bld)   
[#/Vol]         3.82 10*3/uL    Normal          1.20 - 7.70     [facility]  
   
                                        Comment on above:   Performed By: #### C  
BCDF ####  
[facility]  
[address] [place]  
[place] [address]   
   
                                                    Neutrophils/100 WBC   
(Bld)           61.4 %          Normal          40.0 - 80.0     [facility]  
   
                                        Comment on above:   Performed By: #### C  
BCDF ####  
[facility]  
[address]  
[place] [address]   
   
                                                    Nucleated RBC/100 WBC   
(Bld) [Ratio]   0.0 /100 WBC    Normal          0.0 - 0.0       [facility]  
   
                                        Comment on above:   Performed By: #### C  
BCDF ####  
[facility]  
[address]  
[place] [address]   
   
                                                    Platelets (Bld)   
[#/Vol]         245 10*3/uL     Normal          150 - 450       [facility]  
   
                                        Comment on above:   Performed By: #### C  
BCDF ####  
[facility]  
[address]  
[place] [address]   
   
                      RBC (Bld) [#/Vol] 4.18 x10E12/L Normal     4.00 - 5.20 [facility]  
   
                                        Comment on above:   Performed By: #### C  
BCDF ####  
[facility]  
[address]  
[place] [address]   
   
                      WBC (Bld) [#/Vol] 6.2 10*3/uL Normal     4.4 - 11.3 [facility]  
   
                                        Comment on above:   Performed By: #### C  
BCDF ####  
[facility]  
[address]  
[place] [address]   
   
                                                    COMPREHENSIVE PANELon 2019   
   
                      Albumin [Mass/Vol] 3.5 g/dL   Normal     3.4 - 5.0  [facility]  
   
                                        Comment on above:   Performed By: #### C  
BCDF ####  
[facility]  
[address]  
[place] [address]   
   
                                                    ALP [Catalytic   
activity/Vol]   44 U/L          Normal          33 - 110        [facility]  
   
                                        Comment on above:   Performed By: #### C  
BCDF ####  
[facility]  
[address] [place]  
[place] [address]   
   
                                                    ALT [Catalytic   
activity/Vol]   15 U/L          Normal          7 - 45          [facility]  
   
                                        Comment on above:   Result Comment: Hillary  
ents treated with Sulfasalazine may   
generate  
falsely decreased results for ALT.   
   
                                                            Performed By: #### C  
BCDF ####  
[address] [address]   
   
                      Anion gap [Moles/Vol] 13 mmol/L  Normal     10 - 20    [facility]  
   
                                        Comment on above:   Performed By: #### C  
BCDF ####  
[address] [address]   
   
                                                    AST [Catalytic   
activity/Vol]   14 U/L          Normal          9 - 39          [facility]  
   
                                        Comment on above:   Performed By: #### C  
BCDF ####  
[address] [address]   
   
                      Bilirubin [Mass/Vol] 0.3 mg/dL  Normal     0.0 - 1.2  [facility]  
   
                                        Comment on above:   Performed By: #### C  
BCDF ####  
[address] [address]   
   
                      Calcium [Mass/Vol] 8.8 mg/dL  Normal     8.6 - 10.3 [facility]  
   
                                        Comment on above:   Performed By: #### C  
BCDF ####  
[address] [address]   
   
                      Chloride [Moles/Vol] 104 mmol/L Normal     98 - 107   [facility]  
   
                                        Comment on above:   Performed By: #### C  
BCDF ####  
[address] [address]   
   
                      Creatinine [Mass/Vol] 0.56 mg/dL Normal     0.50 - 1.05 [facility]  
   
                                        Comment on above:   Performed By: #### C  
BCDF ####  
[address] [address]   
   
                      GFR- AM. >60        Normal     >60        [facility]  
   
                                        Comment on above:   Result Comment: CALC  
ULATIONS OF ESTIMATED GFR ARE PERFORMED  
USING THE MDRD STUDY EQUATION FOR THE  
IDMS-TRACEABLE CREATININE METHODS.  
CLIN CHEM 2007;53:766-72   
   
                                                            Performed By: #### C  
BCDF ####  
[address] [address]   
   
                      GFR-NON  AM. >60        Normal     >60        [facility]  
   
                                        Comment on above:   Performed By: #### C  
BCDF ####  
[address] [place] [address]   
   
                      Glucose [Mass/Vol] 82 mg/dL   Normal     74 - 99    [facility]  
   
                                        Comment on above:   Performed By: #### C  
BCDF ####  
[address] [address]   
   
                                                    HCO3 (Bld)   
[Moles/Vol]     25 mmol/L       Normal          21 - 32         [facility]  
   
                                        Comment on above:   Performed By: #### C  
BCDF ####  
[address] [address]   
   
                      Potassium [Moles/Vol] 4.0 mmol/L Normal     3.5 - 5.3  [facility]  
   
                                        Comment on above:   Performed By: #### C  
BCDF ####  
[address] [address]   
   
                      Protein [Mass/Vol] 5.8 g/dL   Low        6.4 - 8.2  [facility]  
   
                                        Comment on above:   Performed By: #### C  
BCDF ####  
[address] [address]   
   
                      Sodium [Moles/Vol] 138 mmol/L Normal     136 - 145  [facility]  
   
                                        Comment on above:   Performed By: #### C  
BCDF ####  
[address] [address]   
   
                                                    Urea nitrogen   
[Mass/Vol]      14 mg/dL        Normal          6 - 23          [facility]  
   
                                        Comment on above:   Performed By: #### C  
BCDF ####  
[address] [address]   
   
                                                    Complete Blood Count + Diffe  
ananda 2019   
   
                                                    Basophils (Bld)   
[#/Vol]         0.01 {x10E9/L}                  See Below       [facility]  
[facility]   
303  
Work Phone:   
[phone]  
   
                                        Comment on above:   Reference Range: 0.0  
0 - 0.10   
   
                                                    Basophils/100 WBC   
(Bld)           0.2 %                           0.0 - 2.0       [facility]  
[facility]   
303  
Work Phone:   
[phone]  
   
                                                    Eosinophils (Bld)   
[#/Vol]         0.04 {x10E9/L}                  See Below       [facility]  
[facility]   
303  
Work Phone:   
[phone]  
   
                                        Comment on above:   Reference Range: 0.0  
0 - 0.70   
   
                                                    Eosinophils/100 WBC   
(Bld)           0.6 %                           0.0 - 6.0       [facility]  
[facility]   
303  
Work Phone:   
[phone]  
   
                                                    Erythrocyte   
distribution width   
(RBC) [Ratio]   12.5 %                          See Below       [facility]  
[facility]   
303  
Work Phone:   
[phone]  
   
                                        Comment on above:   Reference Range: 11.  
5 - 14.5   
   
                                                    Hematocrit (Bld)   
[Volume fraction]         35.8 %                    below low   
threshold                 See Below                 [facility]  
[facility]   
303  
Work Phone:   
[phone]  
   
                                        Comment on above:   Reference Range: 36.  
0 - 46.0   
   
                                                    Hemoglobin (Bld)   
[Mass/Vol]                11.5 g/dL                 below low   
threshold                 See Below                 [facility]  
[facility]   
303  
Work Phone:   
[phone]  
   
                                        Comment on above:   Reference Range: 12.  
0 - 16.0   
   
                                                    Lymphocytes (Bld)   
[#/Vol]         1.74 {x10E9/L}                  See Below       [facility]  
[facility]   
303  
Work Phone:   
[phone]  
   
                                        Comment on above:   Reference Range: 1.2  
0 - 4.80   
   
                                                    Lymphocytes/100 WBC   
(Bld)           27.9 %                          See Below       [facility]  
[facility]   
303  
Work Phone:   
[phone]  
   
                                        Comment on above:   Reference Range: 13.  
0 - 44.0   
   
                      MCHC (RBC) [Mass/Vol] 32.1 g/dL             See Below  [facility]  
[facility]   
303  
Work Phone:   
[phone]  
   
                                        Comment on above:   Reference Range: 32.  
0 - 36.0   
   
                                                    MCV (RBC) [Entitic   
vol]            86 fL                           80 - 100        [facility]  
[facility]   
303  
Work Phone:   
[phone]  
   
                                                    Monocytes (Bld)   
[#/Vol]         0.60 {x10E9/L}                  See Below       [facility]  
[facility]   
303  
Work Phone:   
[phone]  
   
                                        Comment on above:   Reference Range: 0.1  
0 - 1.00   
   
                                                    Monocytes/100 WBC   
(Bld)           9.6 %                           2.0 - 10.0      [facility]  
[facility]   
303  
Work Phone:   
[phone]  
   
                                                    Neutrophils (Bld)   
[#/Vol]         3.82 {x10E9/L}                  See Below       [facility]  
[facility]   
303  
Work Phone:   
[phone]  
   
                                        Comment on above:   Reference Range: 1.2  
0 - 7.70   
   
                                                    Neutrophils/100 WBC   
(Bld)           61.4 %                          See Below       [facility]  
[facility]   
303  
Work Phone:   
[phone]  
   
                                        Comment on above:   Reference Range: 40.  
0 - 80.0   
   
                                                    Platelets (Bld)   
[#/Vol]         245 {x10E9/L}                   150 - 450       [facility]  
[facility]   
303  
Work Phone:   
[phone]  
   
                      RBC (Bld) [#/Vol] 4.18 {x10E12/L}            See Below  [facility]  
[facility]   
303  
Work Phone:   
[phone]  
   
                                        Comment on above:   Reference Range: 4.0  
0 - 5.20   
   
                      WBC (Bld) [#/Vol] 6.2 {x10E9/L}            4.4 - 11.3 [facility]  
[facility]  
[facility]   
303  
Work Phone:   
[phone]  
   
                      WBC (Bld) [#/Vol] 0.0 {/100_WBC}            0.0 - 0.0  [facility]  
[facility]   
303  
Work Phone:   
[phone]  
   
                                                    Complete Blood Count   
+ Differential  0.3 %                           0.0 - 0.9       [facility]  
[facility]   
303  
Work Phone:   
[phone]  
   
                                        Comment on above:   Percent differential  
 counts (%) should be interpreted in the   
context of the absolute cell counts (cells/L).   
   
                                                    Daily Progress Note-Surgeryo  
n 2019   
   
                                                    Daily Progress   
Note-Surgery                            Service: Surgery  
  
Subjective Data:  
SHARON FISCHER is a 39   
year old Female who is   
Hospital Day # 2 and POD   
#1  
for 1. Laparoscopic   
Gastric Bypass;2. Hiatal   
Hernia Repair ;3. Upper   
Endoscopy  
;4. Bilateral TAP blocks   
;5.  
  
Additional Information:  
No issues overnight  
  
Objective Data:  
  
Objective Information:  
  
---- Intake and Output   
-----  
Mn/Dy/Year   
TimeIntakeOutputNet  
Dec 19, 2019 2:00   
[id number]  
Dec 19, 2019 6:00   
[id number]  
Dec 18, 2019 10:00   
[id number]  
  
The Intake and Output   
Totals for the last 24   
hours are:  
IntakeOutputNet  
[id number]  
T [id number]  
[id number]/6097%  
Date/Time 14:   
14: 14:   
14: 14:00  
Range(37.1C - 37.9C ) (60   
- 82 ) (18 - 20 ) (123 -   
143 )/ (60 - 81 ) (93%  
- 98% )  
Highest temp of 37.9 C   
was recorded at    
6:02  
  
Physical Exam:  
  
Constitutional: Well   
developed,   
awake/alert/oriented x3,   
no distress, alert and  
cooperative  
Cardiovascular: Regular,   
rate and rhythm, no   
murmurs, 2+ equal pulses   
of the  
extremities, normal S   
1and S 2  
Gastrointestinal: soft,   
appropriately TTP,   
serosanguinous drain   
output  
Neurological: alert and   
oriented x3, intact   
senses, motor, response   
and  
reflexes, normal strength  
Skin: Warm and dry, no   
lesions, no rashes  
  
Assessment and Plan:  
Assessment:  
POD 1 from [facility],   
progressing appropriately  
- Oxycodone, toradol, and   
tylenol for pain  
- FLD  
- SQH  
- Ambulate  
- D/c POD 2  
  
  
Electronic Signatures:  
Bridger Barbosa)   
(Signed 19-Dec-2019   
17:30)  
Authored: Service,   
Subjective Data,   
Objective Data,   
Assessment and Plan,  
Signature/Cosignature/Att  
estation  
  
  
Last Updated: 19-Dec-2019   
17:30 by Bridger Barbosa)                Normal                                  [facility]  
   
                                                    GI TRACT, UPPER W/O KUBon    
   
                                                    GI TRACT, UPPER W/O   
KUB                                     MRN: [id number]  
Patient Name: SHARON FISCHER  
  
STUDY:  
GI TRACT, UPPER W/O KUB;;   
2019 10:33 am  
  
INDICATION:  
Post-Bariatric Surgery.  
  
COMPARISON:  
None.  
  
ACCESSION NUMBER(S):  
[id number]  
  
ORDERING CLINICIAN:  
BRIDGER BARBOSA  
  
TECHNIQUE:  
Patient was administered   
approximately 30 mL   
Gastrografin orally and  
this was visualized under   
fluoroscopy with spot   
fluoroscopic imaging  
obtained.  
  
Fluoroscopy time: 1   
minutes 36 seconds.  
  
FINDINGS:  
Evidence of gastric   
bypass surgery. There is   
focal thickening with  
luminal narrowing in the   
region of the distal   
gastric pouch with  
delayed passage of   
contrast through this   
region and may relate to  
postoperative edema.   
There is some mild   
retained contrast within   
the  
gastric pouch at the   
conclusion of the study.   
No definite contrast  
leakage identified. The   
majority of contrast does   
pass into the  
proximal small bowel.  
  
There are some distended   
gas-filled bowel loops   
elsewhere throughout  
the abdomen probably   
relating to postoperative   
ileus.  
  
IMPRESSION:  
Status post gastric   
bypass surgery. No   
definite contrast leakage  
identified.  
  
Focal thickening and   
luminal narrowing in the   
region of the distal  
gastric pouch with   
delayed passage of   
contrast through this   
region  
which may relate to   
postoperative edema among   
other etiologies.  
Clinical correlation and   
follow-up advised.  
  
Probable postoperative   
ileus.  
  
Electronically signed by:   
DO Alessandro PALACIOS                                  [facility]  
   
                                                    Metabolic Panelon 2019  
   
   
                                                    ALP [Catalytic   
activity/Vol]   44 U/L                          33 - 110        [facility]  
arma MAC2   
303  
Work Phone:   
[phone]  
   
                      Anion gap [Moles/Vol] 13 mmol/L             10 - 20    MG-  
Surgery-P  
arma MAC2   
303  
Work Phone:   
[phone]  
   
                      Bilirubin [Mass/Vol] 0.3 mg/dL             0.0 - 1.2  MG-S  
urgery-P  
arma MAC2   
303  
Work Phone:   
[phone]  
   
                      Calcium [Mass/Vol] 8.8 mg/dL             8.6 - 10.3 MG-Aditya  
joby-P  
arma MAC2   
303  
Work Phone:   
[phone]  
   
                      Chloride [Moles/Vol] 104 mmol/L            98 - 107   MG-S  
urgery-P  
arma MAC2   
303  
Work Phone:   
[phone]  
   
                      CO2 [Moles/Vol] 25 mmol/L             21 - 32    MG-Surger  
y-P  
arma MAC2   
303  
Work Phone:   
[phone]  
   
                      Creatinine [Mass/Vol] 0.56 mg/dL            See Below  MG-  
Surgery-P  
arma MAC2   
303  
Work Phone:   
[phone]  
   
                                        Comment on above:   Reference Range: 0.5  
0 - 1.05   
   
                      Glucose [Mass/Vol] 82 mg/dL              74 - 99    [facility]a  
joby-P  
arma MAC2   
303  
Work Phone:   
[phone]  
   
                      Potassium [Moles/Vol] 4.0 mmol/L            3.5 - 5.3  MG-  
Surgery-P  
arma MAC2   
303  
Work Phone:   
[phone]  
   
                                Protein [Mass/Vol] 5.8 g/dL        below low   
threshold                 6.4 - 8.2                 [facility]  
arma MAC2   
303  
Work Phone:   
[phone]  
   
                      Sodium [Moles/Vol] 138 mmol/L            136 - 145  [facility]a  
joby-P  
arma MAC2   
303  
Work Phone:   
[phone]  
   
                                                    Urea nitrogen   
[Mass/Vol]      14 mg/dL                        6 - 23          [facility]  
arma MAC2   
303  
Work Phone:   
[phone]  
   
                                                    Nutrition Therapy-Educationo  
n 2019   
   
                                                    Nutrition   
Therapy-Education                       Assessment   
Subjective/Objective:  
Note Type: Education  
  
Note Authored by:   
Registered Dietitian   
Nutritionist  
  
Nutrition Note:  
Patient is a 39 year old   
female admit for gastric   
bypass.  
  
Consult for diet   
education per bariatric   
protocol.  
  
Diet Education:  
Nutrition Education:   
Received referral for   
bariatric diet   
instructions.  
Pt. is demonstrating   
proper completion of   
hydration monitoring   
worksheet per  
bariatric   
guidelines/protocol.  
Reviewed importance of   
adequate hydration during   
recovery process. Pt. had  
good understanding of   
this concept.  
Nutrition Education was   
Provided for the   
Following Diet: gastric   
bypass  
Education Provided to:   
patient  
Understanding of Diet:   
good  
Anticipated Compliance:   
good  
Follow up: Patient to   
follow up with bariatric   
dietitian, Caitlyn Betts.   
Already  
has contact info for   
follow up.  
  
  
Time Spent (minutes): 15  
Nutrition Support: no  
  
  
Electronic Signatures:  
Lizzie Gipson   
(RUSTY, ELADIA) (Signed   
19-Dec-2019 15:37)  
Authored: Assessment   
Subjective/Objective,   
Diet Education, Time  
Spent/Nutrition Support  
  
  
Last Updated: 19-Dec-2019   
15:37 by Lizzie Gipson (RUSTY, ELADIA) Normal                                  [facility]  
   
                                                    Otheron 2019   
   
                                                            Interpreted by: JOHN TORRES19   
10:53MRN: [id number]   
Name: SHARON FISCHER   
STUDY:GI TRACT, UPPER W/O   
KUB;; 2019 10:33 am   
INDICATION:Post-Bariatric   
Surgery. COMPARISON:None.   
ACCESSION   
NUMBER(S):[id number]   
ORDERING   
CLINICIAN:BRIDGER BARBOSA   
TECHNIQUE:Patient was   
administered   
approximately 30 mL   
Gastrografin orally   
andthis was visualized   
under fluoroscopy with   
spot fluoroscopic   
imagingobtained.   
Fluoroscopy time: 1   
minutes 36 seconds.   
FINDINGS:Evidence of   
gastric bypass surgery.   
There is focal thickening   
withluminal narrowing in   
the region of the distal   
gastric pouch withdelayed   
passage of contrast   
through this region and   
may relate   
topostoperative edema.   
There is some mild   
retained contrast within   
thegastric pouch at the   
conclusion of the study.   
No definite   
contrastleakage   
identified. The majority   
of contrast does pass   
into theproximal small   
bowel. There are some   
distended gas-filled   
bowel loops elsewhere   
throughoutthe abdomen   
probably relating to   
postoperative ileus.   
IMPRESSION:Status post   
gastric bypass surgery.   
No definite contrast   
leakageidentified. Focal   
thickening and luminal   
narrowing in the region   
of the distalgastric   
pouch with delayed   
passage of contrast   
through this regionwhich   
may relate to   
postoperative edema among   
other etiologies.Clinical   
correlation and follow-up   
advised. Probable   
postoperative ileus.   
Electronically signed by:   
JONH TORRES 19   
10:53               Normal                                  [facility]  
[facility] MAC2   
303  
Work Phone:   
[phone]  
   
                                                    Albumin BCP dye   
[Mass/Vol]      3.5 g/dL                        3.4 - 5.0       [facility]  
arma MAC2   
303  
Work Phone:   
[phone]  
   
                                                    ALT With P-5'-P   
[Catalytic   
activity/Vol]   15 U/L                          7 - 45          [facility]  
arma MAC2   
303  
Work Phone:   
[phone]  
   
                                        Comment on above:   Patients treated wit  
h Sulfasalazine may generate falsely   
decreased results for ALT.   
   
                                                    AST With P-5'-P   
[Catalytic   
activity/Vol]   14 U/L                          9 - 39          [facility]  
arma MAC2   
303  
Work Phone:   
[phone]  
   
                                 >60                   >60        [facility]  
[facility] MAC2   
303  
Work Phone:   
[phone]  
   
                                        Comment on above:   CALCULATIONS OF SHAGGY  
MATED GFR ARE PERFORMED USING THE MDRD   
STUDY   
EQUATION FOR THE IDMS-TRACEABLE CREATININE METHODS. CLIN CHEM   
2007;53:766-72   
   
                                                    Admission Risk Screen  - Ank[place]  
lton 2019   
   
                                                    Admission Risk Screen   
- Adult                                 Allergies:  
Allergies:  
sulfa drugs:   
Hives/Urticaria (Severe)  
  
Patient Verification:  
New W ID Band Applied in   
my Departmentno  
Type of ID Patient is   
WearingW wristband, but   
not applied here  
Patient Transferred from   
Other  Facility ([facility],   
[facility],etc)no  
Patient Identity Verified   
Bypatient  
ID Band FULL Name,   
include Middle, spelling   
matches patient's ID used   
for  
verificationyes  
ID Band  Matches   
Patient ID used for   
Verficationyes  
ID Band MRN Matches EMR   
MRNyes  
  
Advance Directive:  
Advance Directive/DNRno   
(1)  
Advance Directive   
Information   
Givenpatient/family   
declined (1)  
  
Falls Screen:  
Type of   
Assessmentadmission  
Moderate Risk   
Factorspatient care   
equipment (scds, ivs,   
chest tubes, raman,  
etc)  
High Risk Factorssymptoms   
due to meds (sedatives,   
hypnotics, new diuretics   
and  
new laxatives)  
Risk for Injury   
Associated with Fallrisk   
of surgical complications   
post  
surgery (recent   
abdominal, thoracic   
surgery, lower limb   
amputation)  
Fall Risk Conclusionhigh   
falls risk with risk for   
associated injury  
Universal Safety   
InterventionsWDL *orient   
to call system *instruct   
to call for  
assistance before getting   
out of bed *non-slip   
footwear when patient is   
out of  
bed *call bell in reach   
*personal items and   
telephone in reach   
*physically safe  
environment (no spills or   
clutter) *bed in lowest   
position with wheels   
locked  
*appropriate side rails   
in place *room/bathroom   
lighting operational,   
light  
cord in reach   
*appropriate signage on   
door  
  
  
Family Violence Screen:  
Are you or have you been   
threatened or abused   
physically, emotionally,   
or  
sexually by anyoneno  
Do you feel UNSAFE going   
back to the place where   
you are livingno  
Clinical assessment: Are   
there any apparent signs   
of injuries/behaviors   
that  
could be related to   
abuse/neglectno  
Social Service Consult   
for abuse/neglect needed   
this visitno  
  
Functional Screen:  
Functional Screen: In the   
recent/past 2-4 weeks,   
patient or family have  
noticedno issues that   
require a speech/language   
consult at this time  
  
AM-PAC- Basic   
Mobility/Daily Activity:  
Patient baseline   
bedboundno  
Turning from your back to   
your side while in a flat   
bed without using  
bedrailsnone  
Moving from lying on your   
back to sitting on the   
side of a flat bed   
without  
using bedrailsnone  
Moving to and from bed to   
chair (including a   
wheelchair)none  
Standing up from a chair   
using your arms (e.g.   
wheelchair or bedside   
chair)  
none  
To walk in hospital   
roomnone  
Climbing 3-5 steps with   
railingnone  
AM-PAC Basic Mobility-   
Total Score24  
Putting on and taking off   
regular lower body   
clothingnone  
Bathing (including   
washing, rinsing,   
drying)none  
Putting on and taking off   
regular upper body   
clothingnone  
Toileting, which includes   
using toilet, bedpan or   
urinalnone  
Taking care of personal   
grooming such as brushing   
teethnone  
Eating Mealsnone  
AM-PAC Daily Activity-   
Total Score24  
  
Learning Assessment   
(Patient):  
Patient is Able to be   
Assessed for Learningyes  
Factors Influencing   
Readiness to Learnfatigue  
Factors that Impact   
Ability to Learnnone  
Devices/Methods Used to   
Communicateglasses  
Learning   
Preferenceswritten   
material; verbal   
instruction; skill  
demonstration  
Cultural   
Considerationsnone  
Developmental   
Considerationsnone  
Caodaism   
Considerationsnone  
Other Learnersspouse  
  
Learning Assessment   
(Other Learner):  
Other learner   
availableyes...  
Learnerspouse  
Factors Influencing   
Readiness to Learnfatigue  
Factors that Impact   
Ability to Learnnone  
Devices/Methods Used to   
Communicatenone  
Learning   
Preferencesverbal   
instruction, written   
material  
Cultural   
Considerationsnone  
Developmental   
Considerationsnone  
Caodaism   
Considerationsnone  
  
Suicide/Depression   
Screen:  
During the past month,   
have you often been   
bothered by feeling down,  
depressed or hopelessno   
(1)  
During the past month,   
have you often had little   
interest or pleasure in  
doing thingsno (1)  
Have you had any thoughts   
of harming yourselfno (1)  
Have you had any thoughts   
of harming anyone elseno   
(1)  
  
Adult Nutrition Screen:  
Have you recently lost   
weight without tryingno  
Have you been eating   
poorly because of a   
decreased appetiteno  
Malnutrition Screening   
Tool Score0  
Malnutrition Screening   
Tool RiskMST = 0 or 1 Not   
at risk. Eating well with  
little or no weight loss  
Nutrition Consult needed   
this visitno  
Can Patient Participate   
in Room Serviceno  
Patient requires Paper   
Dishes/Plastic Utensilsno  
  
Pain Screen:  
Pain Scalenumerical 0-10   
(1)  
Pain Scale   
Educationteaching   
provided (1)  
Current Pain Level0 =   
None  
Acceptable Pain Level3 =   
Mild  
Expression of Pain   
(nonverbal)verbalization  
Chronic Painno (1)  
  
Spiritual Screen:  
Are there any cultural,   
spiritual, Muslim   
practices/values/needs   
that are  
important for us to   
knowno  
Do you want a visit/item   
from Pastoral Ca[facility]o  
Would you like your   
/   
notifiedno  
  
CAGE:  
Is this an injured   
patient at a Tra[place]   
Center   
[place] [place]  
[place]): no  
  
Vaccinations:  
Vaccination - Influenza   
Vaccination Screen:  
Is it flu season (between   
 and )Yes  
Screening for identified   
contraindications to   
influenza   
vaccinationpatient  
already received vaccine   
this season  
  
Vaccination - Pneumonia   
Vaccination Screen:  
Patient has received a   
previous pneumonia   
vaccine:no/unknown...  
Immunocompetent persons   
with underlying chronic   
conditions or reside in   
long  
term care facilitiesnone   
of these conditions  
Persons with Functional   
or Anatomic Asplenianone   
of these conditions  
Immunocompromised   
Personsnone of these   
conditions  
Pneumonia vaccine NOT   
indicated due to:patient   
DOES NOT have a condition  
that indicates   
vaccination   
patient/caregiver refusal   
at this time  
  
Lorenzo:  
Skin - Lorenzo Scale:  
  
Lorenzo: Sensory   
Perception (response to   
environment)(4) no   
impairment  
Lorenzo: Moisture (degree   
skin exposed to   
moisture)(4) rarely moist  
Lorenzo: Activity (ability   
to walk)(4) walks   
frequently  
Lorenzo: Mobility   
(amount/control of body   
movement)(4) no   
limitation  
Lorenzo: Nutrition   
(quality of food   
intake)(4) excellent  
Lorenzo: Friction and   
Shear(3) no apparent   
problem  
Lorenzo: Score23  
  
Significant Indicatiors:  
Significant Indicators:   
Complete  
  
Pressure Injury:  
Pressure Injury Present   
on Admissionno  
  
  
  
Electronic Signatures:  
Jenise Macdonald (RN)   
(Signed 18-Dec-2019   
15:25)  
Authored: Admission Risk   
Screens, Vaccinations,   
Lorenzo, Pressure Injury  
  
  
Last Updated: 18-Dec-2019   
15:25 by Jenise Macdonald   
(RN)  
  
References:  
1. Data Referenced From   
 Patient Profile - Preop   
v2  17-Dec-2019 13:32 Normal                                  [facility]  
   
                                                    Patient Profile - Adult v2on  
 2019   
   
                                                    Patient Profile -   
Adult v2                                Profile:  
Initial Info:  
How to be AddressedKIM(1)  
Spoken Language   
PreferredEnglish (1)  
Are you currently using   
the Personal Electronic   
Health Record or   
App DreamWorksyes  
(1)  
Stated Reason for   
Admissionstomach surgery  
Primary Contact Name and   
NumberNestor, Spouse,   
[phone]  
Wants Family/Rep Notified   
of Admissionn/a; family   
present  
Notify PCPdo not notify   
PCP  
Informed of Patient   
Visiting Rightsyes  
Arrived FromOR  
Was Admitted To in Past   
90 Daysnone  
Employment   
Statushomemaker  
Patient Belongingsremains   
with patient  
Patient Belongings   
Remaining with   
Patientcell   
phone/electronics;   
clothing  
Medications Brought to   
Hospitalno  
  
General Health:  
Weight in kg80   
kilogram(s)  
Weight in amf827.3   
pound(s)  
Height in feet5 feet  
Height in inches5   
inch(es)  
Height in cm165.1   
centimeter(s)  
BMI (kg/m2)29.349 square   
meter  
Weight Methodactual   
(measured)  
Scale Typebed  
Height Methodstated  
Blood   
Avoidance/Restrictionsnon  
e(1)  
Previous Transfusion   
Reactionno(1)  
  
RSP Based Care:  
How would you like to   
participate in your   
caretell me what is going   
on  
What is the number one   
concern for you during   
this   
hospitalizationnothing  
What is the most   
important thing we can do   
to support you during   
this  
hospitalizationcontrol my   
nausea  
Is there anything we need   
to know to best care for   
youno  
  
Substance:  
Current or Former   
Substance Use never:   
Cigarette/Tobacco(1),  
e-Cigarette/Vaping(1),   
Alcohol(1), Street   
Drugs(1)  
  
  
Health Mgmt:  
Symptoms/Conditions   
Managed at   
Homegastrointestinal;   
neurological; respiratory  
Are You Currently   
Breastfeedingno (2)  
Gastrointestinal   
Managementmanaged  
Neurological   
Symptoms/Conditionsheadac  
he  
Neurological   
Managementmanaged  
Respiratory   
Symptoms/Conditionsosa  
Respiratory Management   
StrategiesCPAP  
Respiratory   
Managementmanaged  
Are You Pregnantno (2)  
  
Relationship/Environ:  
Primary Source of   
Support/Comfortspouse;   
child(maria esther)  
Lives Withspouse;   
dependent child(maria esther)  
Living Arrangementshouse  
Resource/Environmental   
Concernsnone  
[facility]  
Services Anticipated at   
Transitionnone  
Significant   
IndicatorsComplete  
  
  
  
Information Review:  
Allergies, Home Meds and   
Significant Events have   
been Reviewed and   
Verified  
with Patient/Familyyes  
  
ALLERGY, INTOLERANCE,   
ADVERSE EVENT:  
Allergies:  
sulfa drugs: Drug   
Category, Hives/Urticaria   
(Severe), Active  
  
  
Electronic Signatures:  
Jenise Madconald (VENKATA)   
(Signed 18-Dec-2019   
15:20)  
Authored: Profile,   
Additional Information  
  
  
Last Updated: 18-Dec-2019   
15:20 by Jenise Macdonald   
(VENKATA)  
  
References:  
1. Data Referenced From   
 Patient Profile - Preop   
v2  17-Dec-2019 13:32  
2. Data Referenced From   
 History and Physical -   
Surgical Update < 30   
days   
17-Dec-2019 20:15   Normal                                  [facility]  
   
                                                    Preop Checkliston 2019  
   
   
                                        Preop Checklist     Preop Checklist:  
Preop Checklist:  
Arrival [id number]  
Arrival Time07:25  
Procedure   
Typelaparoscopic sleeve   
gastrectomy  
Temperature C36.6 degrees   
C  
Temperature F97.8 degrees   
F  
Heart Rate97 beats per   
minute  
Respiratory Rate18 breath   
per minute  
Blood Pressure   
Ohhrovng903 mm/Hg  
Blood Pressure   
Nvnnschbw16 mm/Hg  
NPO Status18-Dec-2019   
06:30  
NPO Commentlast dose of   
clear fast  
ID Band Onyes  
Allergy Bandyes  
Consent Signedyes  
H&P Completeyes  
Anesthesia Assessment   
Completedyes  
EKG Performedyes  
HCG Urine TestComplete  
Chlorhexadine Bath   
Givencompleted in pre-op  
SCD's Appliedsent to OR  
Valuables Securedbags x 2   
on cart  
Othercpap on cart with pt  
  
Cardiovascular   
Assessment:  
Apicalregular  
  
Respiratory Assessment:  
Respirationsunlabored  
Air Exchangeequal  
Breath Soundsclear  
  
Neurological Assessment:  
Level of   
Consciousnessalert,   
oriented  
Mobilitymoves all   
extremities  
Able to Express Selfyes  
Age Appropriateyes  
Emotional Statuscalm  
  
Preop Education:  
Surgical Site Infection   
Preventionyes  
Pain Scales and   
Managementyes  
  
Language / Communication:  
Language /   
CommunicationEnglish  
  
  
Electronic Signatures:  
Jenny Huston (RN)   
(Signed 18-Dec-2019   
07:55)  
Authored: Preop Checklist  
  
  
Last Updated: 18-Dec-2019   
07:55 by Jenny Huston   
(VENKATA)                Normal                                  [facility]  
   
                                                    Patient Profile - Preop v2on  
 2019   
   
                                                    Patient Profile -   
Preop v2                                Profile:  
Initial Info:  
How to be AddressedKIM(1)  
Spoken Language   
PreferredEnglish (1)  
Source of   
Informationpatient  
Are you currently using   
the Personal Electronic   
Health Record or   
App DreamWorkss  
(1)  
Instructions   
Givenappropriate   
clothing, center   
location, remove  
jewerly/piercings, time   
to arrive, arrival time   
of 0730 for 0900   
surgeryno am  
meds to take clear fast   
at 0600 reviewed surgery   
protocal questions   
answered  
Prep Instructions   
Reviewedyes  
Prep Typeper office  
Instructed to Have No   
Fluids Aftermidnight  
Stated Reason for   
AdmissionGASTRIC BYPASS  
Primary Contact Name and   
NumberSELF OR SPOUSE-SEE   
DEMOGRAPHICS  
Patient Belongingsremains   
with patient  
Patient Belongings   
Remaining with   
Patientclothing; medical   
device; cell  
phone/electronics  
Medications Brought to   
Hospitalno  
  
General Health:  
Weight in kg127.4   
kilogram(s)  
Weight in drp638.8   
pound(s)  
Weight Methodstated  
Height in cm165.1   
centimeter(s)  
Height in feet5 feet  
Height in inches5   
inch(es)  
Height Methodstated  
BMI (kg/m2)46.738 square   
meter  
Patient or Family Member   
Reaction to Anesthesiano   
previous reaction  
Blood   
Avoidance/Restrictionsnon  
e  
Previous Transfusion   
Reactionno  
  
Health Mgmt:  
Symptoms/Conditions   
Managed at [place]  
Are You Currently   
Breastfeedingno  
Barriers to Managing   
Healthnone  
Are You Pregnantno  
  
Relationship/Environ:  
Resource/Environmental   
Concernsnone  
Lives Withdependent   
child(maria esther); spouse  
Living Arrangementshouse  
  
Substance:  
Current or Former   
Substance Use never:   
Cigarette/Tobacco,   
e-Cigarette/Vaping,  
Alcohol, Street Drugs  
  
  
Risk Screens:  
Advance Directive/DNRno  
Advance Directive   
Information   
Givenpatient/family   
declined  
During the past month,   
have you often been   
bothered by feeling down,   
depressed  
or hopelessno  
During the past month,   
have you often had little   
interest or pleasure in   
doing  
thingsno  
Have you had any thoughts   
of harming yourselfno  
Have you had any thoughts   
of harming anyone elseno  
Are you or have you been   
threatened or abused   
physically,emotionally or  
sexually abused by   
anyoneno  
Do you feel UNSAFE going   
back to the place you are   
livingno  
Patient is Able to be   
Assessed for Learningyes  
Factors Influencing   
Readiness to Learnanxiety  
Factors that Impact   
Ability to Learnnone  
Devices/Methods Used to   
Communicatenone  
Learning   
Preferencesverbal   
instruction; individual   
instruction  
Cultural   
Considerationsnone  
Developmental   
Considerationsnone  
Caodaism   
Considerationsnone  
Other learner availableno  
Falls RiskPatient   
location auto qualifies   
him/her for HIGH RISK.  
Are there any cultural,   
spiritual, Muslim   
practices/values/needs   
that are  
important for us to   
knowno  
Pain Scalenumerical 0-10  
Pain Scale   
Educationteaching   
provided  
Current Pain Level0 =   
None  
Acceptable Pain Level3 =   
Mild  
Chronic Painno  
  
  
  
Information Review:  
Allergies, Home Meds and   
Significant Events have   
been Reviewed and   
Verified  
with Patient/Familyyes  
  
Allergy, Intolerance,   
Adverse Event:  
Allergies:  
sulfa drugs: Drug   
Category, Hives/Urticaria   
(Severe), Active  
  
  
Electronic Signatures:  
Merlyn Childs (RN)   
(Signed 17-Dec-2019   
13:35)  
Authored: Profile,   
Additional Information  
Jenny Huston)   
(Signed 18-Dec-2019   
07:59)  
Authored: Lana,   
Additional Information  
  
  
Last Updated: 18-Dec-2019   
07:59 by Jenny Huston   
(VENKATA)  
  
References:  
1. Data Referenced From   
 Patient Profile -   
Procedure/H+P    
2019 07:07   Normal                                  [facility]  
   
                                                    Bariatric Surgery - Pre Opon  
 2019   
   
                                                    Bariatric Surgery -   
Pre Op                                  Chief Complaint  
The patient is being seen   
for pre operative visit.   
Type of surgery: [place]-y   
Gastric Bypass . Surgery   
date: 19.  
  
History of Present   
Illness  
Type of Surgery: lap   
london-en-y gastric bypass,   
surgery Date: 19.  
Weight:.  
Initial weight: 281 lbs.  
Last visit weight: 266   
lbs.  
Severity of obesity is   
Class 3 which is a BMI of   
greater than or equal to   
40.  
The following clearances   
were received - Psych:   
cleared.  
Sleep study results: mild   
apnea and compliant with   
CPAP.  
EGD findings: z line   
regulare @ 35 cm,   
esophageal ulcer, reflux   
esophagities, 2 cm HH,   
HILLS IV.  
hiatal hernia.  
H. Pylori is negative.  
Pre-admit testing was   
done on 12/3/19.   
Findings: contaminated   
UA. A Chest X-Ray was not   
performed.  
UA was abnormal . An ATB   
was not ordered.  
Lab results: Hgb: WNL,   
Iron: 31/ sat 9%,   
replaced iron, B12: 579,   
Vitamin D: 27, Hbg A1C: 5   
and B1: 91.  
Lifestyle: she is of   
childbearing age, tox   
screen negative.  
Lifestyle changes made   
include eating habits.  
Preop diet: compliant .  
Comorbidities: anxiety,   
esophageal reflux and   
sleep apnea using an   
appliance.  
38 yo F, here today for   
final pre-op visit. She   
is scheduled for a   
Gastric bypass with   
hiatal hernia repair on   
19. PMH significant   
for morbid obesity,   
gestational diabetes,   
significant heartburn,   
mild KATHY.  
Pt. optimized for surgery   
and has met insurance   
requirements.  
PCP: cleared  
Psych: cleared  
Card/Pulm: note required  
  
Review of Systems  
Constitutional: no   
chills, no fever and no   
night sweats.  
Eyes: no blurred vision   
and no eyesight problems.  
ENT: no hearing loss, no   
nasal congestion, no   
nasal discharge, no   
hoarseness and no sore   
throat.  
Cardiovascular: no chest   
pain, no intermittent leg   
claudication, no lower   
extremity edema, no   
palpitations and no   
syncope.  
Respiratory: no cough, no   
shortness of breath   
during exertion, no   
shortness of breath at   
rest and no wheezing.  
Gastrointestinal: no   
abdominal pain, no blood   
in stools, no   
constipation, no   
diarrhea, no melena, no   
nausea, no rectal pain   
and no vomiting.  
Genitourinary: no   
dysuria, no change in   
urinary frequency, no   
urinary hesitancy, no   
feelings of urinary   
urgency and no vaginal   
discharge.  
Musculoskeletal: no   
arthralgias, no back pain   
and no myalgias.  
Integumentary: no new   
skin lesions and no   
rashes.  
Neurological: no   
difficulty walking, no   
headache, no limb   
weakness, no numbness and   
no tingling.  
Psychiatric: no anxiety,   
no depression, no   
anhedonia and no   
substance use disorders.  
Endocrine: no recent   
weight gain and no recent   
weight loss.  
Hematologic/Lymphatic: no   
tendency for easy   
bruising and no swollen   
glands.  
10-point ROS negative   
other than noted in HPI.  
  
Active Problems  
AMA (advanced maternal   
age) multigravida 35+   
(659.63) (O09.529)  
Chronic daily headache   
(784.0) (R51)  
Common migraine without   
aura (346.10) (G43.009)  
Disorder of iron   
metabolism, unspecified   
(275.09) (E83.10)  
Encounter for   
pre-bariatric surgery   
counseling and education   
(V65.49) (Z71.89)  
Reviewed recent labs  
Paperwork completed  
Encounter for special   
screening examination for   
nutritional disorder   
(V77.99) (Z13.21)  
Excessive daytime   
sleepiness (780.54)   
(G47.19)  
Foot injury, left,   
initial encounter (959.7)   
(S99.922A)  
GERD (gastroesophageal   
reflux disease) (530.81)   
(K21.9)  
Gestational diabetes   
mellitus (648.80)   
(O24.419)  
Left ankle sprain   
(845.00) (S93.402A)  
Migraine (346.90)   
(G43.909)  
Has been quiescent for   
now  
Morbid obesity (278.01)   
(E66.01)  
We will run baseline   
blood tests  
Referral to bariatric   
surgery after review of   
test results  
Morbid obesity with BMI   
of 45.0-49.9, adult   
(278.01,V85.42)   
(E66.01,Z68.42)  
KATHY (obstructive sleep   
apnea) (327.23) (G47.33)  
Pre-bariatric surgery   
nutrition evaluation   
(V65.3) (Z71.3)  
Screening for diabetes   
mellitus (V77.1) (Z13.1)  
A1c  
Sprain of foot, left,   
initial encounter   
(845.10) (S93.602A)  
Vitamin D deficiency   
(268.9) (E55.9)  
Status post treatment  
Vitamin D level ordered  
Past Medical History  
History of anxiety   
(V11.8) (Z86.59)  
History of vitamin D   
deficiency (V12.1)   
(Z86.39)  
History of Migraine   
headache (346.90)   
(G43.909)  
Surgical History  
History of Dilation And   
Curettage  
Family History  
Family history of   
diabetes mellitus (V18.0)   
(Z83.3)  
Family history of   
cerebrovascular accident   
(CVA) (V17.1) (Z82.3)  
Social History  
Former smoker (V15.82)   
(Z87.891)  
No alcohol use  
No caffeine use  
Non-smoker (V49.89)   
(Z78.9)  
Allergies  
sulfa  
Recorded By: Karen Day; 10/7/2014   
11:07:12 AM  
Current Meds  
Iron 325 (65 Fe) MG Oral   
Tablet; TAKE 1 TABLET   
Twice daily 2 hrs   
separate from  
calcium, take with MVI;  
Therapy: 55Brc7938 to   
(Evaluate:91Emy8634)   
Requested for: 90Ivz1760;   
Last  
Rx:65Fty7084 Ordered  
Rx By: Leydi Luong;   
Dispense: 30 Days ; #:60   
Tablet; Refill: 2;For:   
Disorder of iron   
metabolism, unspecified;   
BRYON = N; Verified   
Transmission to [facility] #07931  
Vitamin C Oral Tablet   
Chewable; TAKE 1 TABLET 3   
times daily Take 1 tablet   
with your  
Iron each time;  
Therapy: 08Vwu1426 to   
(Evaluate:73Lpt9947)   
Requested for: 68Bws6880;   
Last  
Rx:99Gcf2063 Ordered  
Rx By: Leydi Luong;   
Dispense: 30 Days ; #:90   
Tablet; Refill: 5;For:   
Disorder of iron   
metabolism, unspecified;   
BRYON = N; Verified   
Transmission to [facility] #12599; Msg to   
Pharmacy: 500mg dose   
chewables  
Omeprazole 40 MG Oral   
Capsule Delayed Release;   
TAKE 1 CAPSULE Daily;  
Therapy: 35Lft4435 to   
(Last Rx:08Tae3291)   
Requested for: 04Cmi7226   
Ordered  
Rx By: Leydi Luong;   
Dispense: 0 Days ; #:30   
Capsule; Refill: 2;For:   
GERD (gastroesophageal   
reflux disease); BRYON = N;   
Verified Transmission to   
[facility]   
#21187  
Ibuprofen 200 MG Oral   
Tablet; 5 tablets daily;  
Therapy:   
(Recorded:73Baq0430) to   
Recorded  
Dispense: 0 Days ; #:   
Sufficient Tablet;   
Refill: 0; BRYON = N;   
Record; Last Updated By:   
Mariana Tipton;   
2016 10:10:18 AM  
Vitals  
Vital Signs  
Recorded: 65Vub0108   
10:48AM  
Heart Rate72  
Ujrcjplw476  
Mxlcbetww22  
Height5 ft 5 in  
Zrkpmu929 lb 6 oz  
BMI Lwmxibanxc11.99  
BSA Calculated2.23  
Physical Exam  
General appearance: In no   
acute distress, well   
appearing and well   
nourished.  
Obese with central   
distribution.  
Eyes Conjunctiva and   
lids: No erythema,   
swelling or discharge.   
Ocular Motility Exam:   
EOMI.  
Ears, Nose, Mouth, and   
Throat - Oropharynx:   
Normal with no erythema,   
edema, exudate or   
lesions.  
Neck - Neck   
circumference: normal  
Pulmonary - Respiratory   
effort: Normal   
respiration.  
Cardiovascular -   
Examination of   
extremities for edema   
and/or varicosities: No   
peripheral edema.  
Abdomen - Abdomen:   
Non-tender, no abdominal   
masses. Liver and spleen:   
No hepato- splenomegaly.   
Anus, perineum, and   
rectum: Examination   
deferred.  
Lymphatic - Palpation of   
lymph nodes: No   
lymphadenopathy. No   
lymphadema in the lower   
extremities.  
Musculoskeletal - Digits   
and nails: Normal without   
clubbing or cyanosis.   
Muscle strength/tone:   
Normal.  
Skin - Skin and   
subcutaneous tissue:   
Normal without rashes or   
lesions. no venous   
stasis.  
Neurologic - Cranial   
Nerve Exam: Normal.   
Coordination: Normal   
gait.  
Psychiatric - Orientation   
to person, place, and   
time: Normal. Mood and   
affect: Normal.  
  
Results/Data  
Electrocardiogram 12   
Bggt26Bkp1097   
10:19AMAbbas, Mujjahid  
Test   
NameResultFlagReference  
Ventricular Rate81  
Atrial Rate81  
CA-Wfemcnhf634  
QRS Uioschcb61  
Q-T Bpzzrowh035  
QTC Uhexitgzzdl539  
P Axis27  
DiagnosisNormal sinus   
rhythm  
R Axis11  
T Axis9  
QRS Count14  
Q Cwzxh695  
P Syfvq883  
P Huvlqy932  
T Jlkvkp984  
QTC Mtmpgkmlmk988  
MUSEIMAGE  
[email or web address]  
[email or web address]  
?RetrieveTestByDateTime?P  
qrbfmuHQ=[id number]  
Fvjwgjfbyn26Him5589   
09:55AMAbbas, Mujjahid  
Test   
NameResultFlagReference  
Color, UrineYELLOWSee   
Below  
Reference Range:   
STRAW,YELLOW  
Appearance,   
UrineHAZYCLEAR  
Specific Gravity,   
Urine1.028See Below  
Reference Range: 1.005 -   
1.035  
pH, Urine6.05.0 - 8.0  
Protein, Urine30 (1+)   
mg/dLANEGATIVE  
Glucose, UrineNEGATIVE   
mg/dLNEGATIVE  
Blood,   
UrineNEGATIVENEGATIVE  
Ketones, UrineNEGATIVE   
mg/dLNEGATIVE  
Bilirubin,   
UrineNEGATIVENEGATIVE  
Urobilinogen, Urine<2.0   
mg/dL0.0 - 1.9  
Nitrite,   
UrineNEGATIVENEGATIVE  
Leukocyte Esterase,   
UrineMODERATE   
(2+)ANEGATIVE  
Complete Blood Count +   
Ofgqulzounjw63Nwm6523   
09:55AMAbbas, Leydi  
Test   
NameResultFlagReference  
White Blood Cell Count5.1   
x10E9/L4.4 - 11.3  
Red Blood Cell Count4.43   
x10E12/LSee Below  
Reference Range: 4.00 -   
5.20  
Nucleated Erythrocyte   
Count0.0 /100 WBC0.0 -   
0.0  
Slzqxeadvo79.3 g/dLSee   
Below  
Reference Range: 12.0 -   
16.0  
HCT37.7 %See Below  
Reference Range: 36.0 -   
46.0  
MCV85 fL80 - 100  
MCHC32.6 g/dLSee Below  
Reference Range: 32.0 -   
36.0  
Platelet Ggdtx387   
x10E9/L150 - 450  
RDW-CV12.6 %See Below  
Reference Range: 11.5 -   
14.5  
Neutrophil %54.9 %See   
Below  
Reference Range: 40.0 -   
80.0  
% Automated Immature   
Gran0.4 %0.0 - 0.9  
Percent differential   
counts (%) should be   
interpreted in the   
context of the absolute   
cell counts (cells/L).  
Lymphocyte %34.4 %See   
Below  
Reference Range: 13.0 -   
44.0  
Monocyte %6.4 %2.0 - 10.0  
Eosinophil %3.3 %0.0 -   
6.0  
Basophil %0.6 %0.0 - 2.0  
Neutrophil Count2.81   
x10E9/LSee Below  
Reference Range: 1.20 -   
7.70  
Lymphocyte Count1.76   
x10E9/LSee Below  
Reference Range: 1.20 -   
4.80  
Monocyte Count0.33   
x10E9/LSee Below  
Reference Range: 0.10 -   
1.00  
Eosinophil Count0.17   
x10E9/LSee Below  
Reference Range: 0.00 -   
0.70  
Basophil Count0.03   
x10E9/LSee Below  
Reference Range: 0.00 -   
0.10  
Urinalysis,   
Yojpalgfdal56Iwb6593   
09:55Leydi Land  
Test   
NameResultFlagReference  
White Cells3 /HPF0-5  
Red Blood Cells1 /HPF0-5  
Epithelial Cells,   
Squamous7 /HPF  
Bacteria, Urine1+ /HPFA  
Mucous3+ /LPF  
Hyaline CastsOCC /LPFA  
PT/YTP79Gxj0811   
09:55Leydi Land  
Test   
NameResultFlagReference  
PROTHROMBIN TIME12.5   
sec9.7 - 12.7  
PT, INR1.10.9 - 1.1  
Comprehensive Metabolic   
Zqifp40Inb2176   
09:55Leydi staley  
Test   
NameResultFlagReference  
Glucose, Serum83 mg/dL74   
- 99  
Sodium, Vslnv699   
mmol/L136 - 145  
POTASSIUM4.1 mmol/L3.5 -   
5.3  
Chloride, Wlqrj477   
mmol/L98 - 107  
Bicarbonate, Serum24   
mmol/L21 - 32  
Anion Gap, Serum14   
mmol/L10 - 20  
Blood Urea Nitrogen,   
Serum23 mg/dL6 - 23  
CREATININE0.54 mg/dLSee   
Below  
Reference Range: 0.50 -   
1.05  
Calcium, Serum9.3   
mg/dL8.6 - 10.3  
Albumin, Serum4.1 g/dL3.4   
- 5.0  
ALKALINE MRZCGVDUPGS43   
U/L33 - 110  
Protein, Total Serum6.5   
g/dL6.4 - 8.2  
Bilirubin, Serum Total0.4   
mg/dL0.0 - 1.2  
ALT (SGPT), Serum10 U/L7   
- 45  
Patients treated with   
Sulfasalazine may   
generate falsely   
decreased results for   
ALT.  
GFR Non    
American>60   
mL/min/1.73m2>60  
GFR >60   
mL/min/1.73m2>60  
CALCULATIONS OF ESTIMATED   
GFR ARE PERFORMED USING   
THE MDRD STUDY EQUATION   
FOR THE IDMS-TRACEABLE   
CREATININE METHODS. CLIN   
CHEM 2007;53:766-72  
AST10 U/L9 - 39  
Type and Iuumtg42Iuh8207   
09:Leydi lebron  
Test   
NameResultFlagReference  
ABOB  
RH TYPEPOS  
Antibody ScreenNEG  
Cult, Qpsfr45Xvr5760   
09:55Leydi Land  
Test   
NameResultFlagReference  
Culture, Urine(Report)  
PATIENT: SHARON FISCHER MRN: [id number] LOCATION:   
[facility] BILL#: [id number]   
: 78 AGE: SEX: F   
ORDER#: [id number]   
ORDERED BY: LEYDI LUONG SOURCE: URINE   
COLLECTED: 19 09:55   
ANTIBIOTICS AT [facility].:   
RECEIVED : 19 19:28   
SITE: R E S U L T S URINE   
CULTURE,BACTERIAL FINAL   
19 12:34 MIXED   
URETHRAL DIGNA.  
Surgical   
Zhekavgwf89Win2621   
08:30Leydi Land  
Test   
[facility]   
Pathology(Report)  
Name SHARON FISCHER.   
Accession #: [id number]   
Pathologist: MARY DIAZ MD Date of   
Procedure: 2019 Date   
Received: 2019 Date   
Reported 2019   
Submitting Physician:   
LEYDI LUONG MD   
Location: Copy   
To/Referring/Attending:   
ULISSES JO MD Other   
External # FINAL   
DIAGNOSIS A. ANTRUM,   
BIOPSY: - ANTRAL GASTRIC   
MUCOSA WITH CHANGES OF   
THE MILD REACTIVE   
GASTROPATHY. - NO H.   
PYLORI ORGANISMS ARE   
IDENTIFIED ON MARIA ELENA   
SECTIONS. B. ESOPHAGEAL   
ULCER, BIOPSY: - SQUAMOUS   
MUCOSA WITH FOCAL ACUTE   
ULCERATION AND FEATURES   
OF REFLUX ESOPHAGITIS. -   
ATTACHED COLUMNAR MUCOSA   
WITH MILD CHRONIC   
INFLAMMATION. - NO   
EVIDENCE OF INTESTINAL   
METAPLASIA, DYSPLASIA OR   
MALIGNANCY. - SPECIAL   
PAS-F STAIN IS NEGATIVE   
FOR FUNGAL ELEMENTS.   
Electronically Signed Out   
By MARY DIAZ MD/TNB By   
the signature on this   
report, the individual or   
group listed as making   
the Final   
Interpretation/Diagnosis   
certifies that they have   
reviewed this case.   
Microscopic Description:   
All slides are examined   
microscopically and the   
diagnosis is stated   
above. All control slides   
react appropriately.   
Clinical History:   
Esophageal ulcer, h   
hernia, gastritis,   
H.Pylori EGD Specimens   
Submitted As: A: BX   
ANTRUM B: BX ESOPHAGEAL   
ULCER Gross Description:   
A. Received in formalin,   
labeled with the   
patient's name and   
hospital number and   
 biopsy antrum   are 2   
fragments of tan, soft   
tissue varying in size   
from 0.3-0.4 cm. The   
specimen is submitted in   
toto in one cassette. KED   
B. Received in formalin,   
labeled with the   
patient's name and   
hospital number and   
 biopsy esophageal ulcer   
  are 2 fragments of tan,   
soft tissue varying in   
size from 0.2-0.4 cm. The   
specimen is submitted in   
toto in one cassette. KED   
ked/2019 The   
assays/tests were   
performed with   
appropriate positive and   
negative controls which   
stained appropriately.  
Endoscopy - Upper   
[id number] 08:17AMNon   
Ambulatory, Provider  
Test   
NameResultFlagReference  
Provimage  
[email or web address]  
[email or web address]={[email or web address]  
[id number]  
[id number]}  
Upper GI   
Endoscopy(Report)  
Patient Name: Sharon Fischer Procedure Date:   
2019 8:17 AM MRN:   
[id number] Account Number:   
[id number] YOB: 1980 Admit Type:   
Outpatient Site: [facility]   
Endoscopy Room 1   
Ethnicity: Not    
or  Race: White   
Attending MD: Leydi Luong MD Procedure:   
Upper GI endoscopy   
Indications: Heartburn,   
Preoperative assessment   
for bariatric surgery to   
treat morbid obesity,   
obesity Providers:   
Brittany Garcia MD   
(Surgical Resident),   
Leydi Luong MD   
(Doctor), Margi Alvarenga RN (Nurse) ,   
Tamela Cervantes, VENKATA   
(Nurse) , Camryn Dove,   
Technician, Brittany Garcia MD (Fellow)   
Referring: Leydi Luong MD Medicines:   
Midazolam 4 mg IV,   
Fentanyl 125 micrograms   
IV Complications: No   
immediate complications.   
Procedure: Pre-Anesthesia   
Assessment: - Prior to   
the procedure, a History   
and Physical was   
performed, and patient   
medications and allergies   
were reviewed. The   
patient is competent. The   
risks and benefits of the   
procedure and the   
sedation options and   
risks were discussed with   
the patient. All   
questions were answered   
and informed consent was   
obtained. Patient   
identification and   
proposed procedure were   
verified by the   
physician, the nurse and   
the technician in the   
procedure room in the   
endoscopy suite. Mental   
Status Examination: alert   
and oriented. Airway   
Examination: normal   
oropharyngeal airway and   
neck mobility.   
Respiratory Examination:   
clear to auscultation. CV   
Examination: normal.   
Prophylactic Antibiotics:   
The patient does not   
require prophylactic   
antibiotics. Prior   
Anticoagulants: The   
patient has taken no   
previous anticoagulant or   
antiplatelet agents. ASA   
Grade Assessment: II - A   
patient with mild   
systemic disease. After   
reviewing the risks and   
benefits, the patient was   
deemed in satisfactory   
condition to undergo the   
procedure. The anesthesia   
plan was to use moderate   
sedation / analgesia   
(conscious sedation).   
Immediately prior to   
administration of   
medications, the patient   
was re-assessed for   
adequacy to receive   
sedatives. The heart   
rate, respiratory rate,   
oxygen saturations, blood   
pressure, adequacy of   
pulmonary ventilation,   
and response to care were   
monitored throughout the   
procedure. The physical   
status of the patient was   
re-assessed after the   
procedure. After   
obtaining informed   
consent, the endoscope   
was passed under direct   
vision. Throughout the   
procedure, the patient's   
blood pressure, pulse,   
and oxygen saturations   
were monitored   
continuously. The   
endoscope was introduced   
through the mouth, and   
advanced to the second   
part of duodenum. The   
upper GI endoscopy was   
accomplished without   
difficulty. The patient   
tolerated the procedure   
well. Findings: A 2 cm   
hiatal hernia was   
present. LA Grade A (one   
or more mucosal breaks   
less than 5 mm, not   
extending between tops of   
2 mucosal folds)   
esophagitis with no   
bleeding was found 35 cm   
from the incisors The   
Z-line was regular and   
was found 35 cm from the   
incisors. The   
gastroesophageal flap   
valve was visualized   
endoscopically and   
classified as Hill Grade   
IV (no fold, wide open   
lumen, hiatal hernia   
present). Scattered   
minimal inflammation   
characterized by erosions   
and erythema was found in   
the prepyloric region of   
the stomach. Biopsies   
were taken with a cold   
forceps for Helicobacter   
pylori testing. Estimated   
blood loss was minimal.   
The exam was otherwise   
without abnormality. A   
small hiatal hernia was   
present. The ampulla,   
duodenal bulb, first   
portion of the duodenum   
and second portion of the   
duodenum were normal. .   
Impression: - 2 cm hiatal   
hernia. - Z-line regular,   
35 cm from the incisors.   
- Gastroesophageal flap   
valve classified as Hill   
Grade IV (no fold, wide   
open lumen, hiatal hernia   
present). - Gastritis.   
Biopsied. - The   
examination was otherwise   
normal. - Small hiatal   
hernia. - Normal ampulla,   
duodenal bulb, first   
portion of the duodenum   
and second portion of the   
duodenum. Recommendation:   
- Patient has a contact   
number available for   
emergencies. The signs   
and symptoms of potential   
delayed complications   
were discussed with the   
patient. Return to normal   
activities tomorrow.   
Written discharge   
instructions were   
provided to the patient.   
- Resume previous diet. -   
Use Prilosec (omeprazole)   
40 mg PO daily for 6   
weeks. - Continue present   
medications. Procedure   
Code(s): --- Professional   
--- 02427,   
Esophagogastroduodenoscop  
y, flexible, transoral;   
with biopsy, single or   
multiple --- Technical   
--- 32927,   
Esophagogastroduodenoscop  
y, flexible, transoral;   
with biopsy, single or   
multiple Diagnosis   
Code(s): --- Professional   
--- K44.9, Diaphragmatic   
hernia without   
obstruction or gangrene   
K29.70, Gastritis,   
unspecified, without   
bleeding R12, Heartburn   
Z01.818, Encounter for   
other preprocedural   
examination E66.01,   
Morbid (severe) obesity   
due to excess calories   
--- Technical --- K44.9,   
Diaphragmatic hernia   
without obstruction or   
gangrene K29.70,   
Gastritis, unspecified,   
without bleeding R12,   
Heartburn Z01.818,   
Encounter for other   
preprocedural examination   
E66.01, Morbid (severe)   
obesity due to excess   
calories CPT copyright   
2017 American Medical   
Association. All rights   
reserved. The codes   
documented in this report   
are preliminary and upon   
 review may be   
revised to meet current   
compliance requirements.   
Attending Participation:   
I was present and   
participated during the   
entire procedure,   
including non-key   
portions. Leydi Luong MD 2019 8:40:23 AM   
This report has been   
signed electronically.   
MD Brittany Llanos MD   
Number of Addenda: 0 Note   
Initiated On: 2019   
8:17 AM Estimated Blood   
Loss: Estimated blood   
loss was minimal.  
Drug Screen,   
[id number]   
12:01PMLeydi Luong  
Test   
NameResultFlagReference  
DRUG SCREEN COMMENTSEE   
BELOW  
Drug screen results are   
presumptive and should   
not be used to assess   
compliance with   
prescribed medication.   
Contact the performing   
[facility] laboratory to add-on   
definitive confirmatory   
testing if clinically   
indicated. . Toxicology   
screening results are   
reported qualitatively.   
The concentration must be   
greater than or equal to   
the cutoff to be reported   
as positive. The   
concentration at which   
the screening test can   
detect an individual drug   
or metabolite varies. The   
absence of expected   
drug(s) and/or drug   
metabolite(s) may   
indicate non-compliance,   
inappropriate timing of   
specimen collection   
relative to drug   
administration, poor drug   
absorption,   
diluted/adulterated   
urine, or limitations of   
testing. For medical   
purposes only; not valid   
for forensic use. .   
Interpretive questions   
should be directed to the   
laboratory medical   
directors.  
AMPHETAMINE   
SCREEN,URINEPRESUMPTIVE   
NEGATIVENEGATIVE  
CUTOFF LEVEL: 500 NG/ML   
Cross-reactivity has been   
reported with high   
concentrations of the   
following drugs:   
buproprion, chloroquine,   
chlorpromazine,   
ephedrine, mephentermine,   
fenfluramine,   
phentermine,   
phenylpropanolamine,   
pseudoephedrine, and   
propranolol.  
Barbiturate Screen,   
UrinePRESUMPTIVE   
NEGATIVENEGATIVE  
CUTOFF LEVEL: 200 NG/ML  
BENZODIAZEPINE   
SCREEN,URINEPRESUMPTIVE   
NEGATIVENEGATIVE  
CUTOFF LEVEL: 200 NG/ML  
Cannabinoid Screen,   
UrinePRESUMPTIVE   
NEGATIVENEGATIVE  
CUTOFF LEVEL: 50 NG/ML  
Cocaine Metabolite   
Screen, UrinePRESUMPTIVE   
NEGATIVENEGATIVE  
CUTOFF LEVEL: 150 NG/ML  
METHADONE   
SCREEN,URINEPRESUMPTIVE   
NEGATIVENEGATIVE  
CUTOFF LEVEL: 150 NG/ML   
The metabolite   
L-alpha-acetylmethadol   
(LAAM) is not detected by   
this method in   
concentrations that would   
be found in the urine of   
patients on LAAM therapy.  
OPIATE   
SCREEN,URINEPRESUMPTIVE   
NEGATIVENEGATIVE  
CUTOFF LEVEL: 300 NG/ML   
The opiate screen does   
not detect fentanyl,   
meperidine, or tramadol.   
Oxycodone is not   
consistently detected   
(refer to Oxycodone   
Screen, Urine result).  
OXYCODONE   
SCREEN,URINEPRESUMPTIVE   
NEGATIVENEGATIVE  
CUTOFF LEVEL: 100 NG/ML   
This test will accurately   
detect both oxycodone and   
oxymorphone.  
PCP   
SCREEN,URINEPRESUMPTIVE   
NEGATIVENEGATIVE  
CUTOFF LEVEL: 25 NG/ML   
Cross-reactivity has been   
reported with   
dextromethorphan.  
HELICOBACTER PYLORI   
BREATH [id number]   
12:01PMAbbas, Rolfrod  
Test   
NameResultFlagReference  
Urea Breath   
TestNEGATIVENEGATIVE  
ANTIMICROBIALS, PROTON   
PUMP INHIBITORS AND   
BISMUTH PREPARATIONS ARE   
KNOWN TO SUPPRESS H.   
PYLORI AND INGESTION OF   
THESE WITHIN 2 WEEKS   
PRIOR TO PERFORMING THE   
BREATHTEK BUT MAY GIVE   
FALSE NEGATIVES. THE   
BREATHTEK UBT SHOULD NOT   
BE USED UNTIL 4 WEEKS OR   
MORE AFTER THE END OF   
TREATMENT FOR THE   
ERADICATION OF H. PYLORI,   
AS EARLIER POST-TREATMENT   
ASSESSMENT MAY GIVE FALSE   
NEGATIVES.  
Complete Blood Count +   
[id number]   
12:01PMAbbas, Mujjahid  
Test   
NameResultFlagReference  
White Blood Cell Count3.7   
x10E9/LL4.4 - 11.3  
Red Blood Cell Count4.23   
x10E12/LSee Below  
Reference Range: 4.00 -   
5.20  
Nucleated Erythrocyte   
Count0.0 /100 WBC0.0 -   
0.0  
Vmszexpfum77.9 g/dLLSee   
Below  
Reference Range: 12.0 -   
16.0  
HCT36.9 %See Below  
Reference Range: 36.0 -   
46.0  
MCV87 fL80 - 100  
MCHC32.2 g/dLSee Below  
Reference Range: 32.0 -   
36.0  
Platelet Qyrpl964   
x10E9/L150 - 450  
RDW-CV12.8 %See Below  
Reference Range: 11.5 -   
14.5  
Neutrophil %51.8 %See   
Below  
Reference Range: 40.0 -   
80.0  
% Automated Immature   
Gran0.5 %0.0 - 0.9  
Percent differential   
counts (%) should be   
interpreted in the   
context of the absolute   
cell counts (cells/L).  
Lymphocyte %35.7 %See   
Below  
Reference Range: 13.0 -   
44.0  
Monocyte %9.3 %2.0 - 10.0  
Eosinophil %2.2 %0.0 -   
6.0  
Basophil %0.5 %0.0 - 2.0  
Neutrophil Count1.90   
x10E9/LSee Below  
Reference Range: 1.20 -   
7.70  
Lymphocyte Count1.31   
x10E9/LSee Below  
Reference Range: 1.20 -   
4.80  
Monocyte Count0.34   
x10E9/LSee Below  
Reference Range: 0.10 -   
1.00  
Eosinophil Count0.08   
x10E9/LSee Below  
Reference Range: 0.00 -   
0.70  
Basophil Count0.02   
x10E9/LSee Below  
Reference Range: 0.00 -   
0.10  
Coagulation   
Xlccsv56Jfi4612   
12:01PMAbsLeydi  
Test   
NameResultFlagReference  
PROTHROMBIN TIME13.0   
secH9.7 - 12.7  
PT, INR1.2H0.9 - 1.1  
APTT29 sec28 - 38  
THE APTT IS NO LONGER   
USED FOR MONITORING   
UNFRACTIONATED HEPARIN   
THERAPY. FOR MONITORING   
HEPARIN THERAPY, USE THE   
HEPARIN ASSAY.  
Hemoglobin D7S21Uks0753   
12:01PMAbsLeydi  
Test   
NameResultFlagReference  
Hemoglobin A1C, Level5.0   
%  
Diagnosis of   
Diabetes-Adults   
Non-Diabetic: < or = 5.6%   
Increased risk for   
developing diabetes:   
5.7-6.4% Diagnostic of   
diabetes: > or = 6.5% .   
Monitoring of Diabetes   
Age (y) Therapeutic Goal   
(%) Adults: >18 <7.0   
Pediatrics: 13-18 <7.5   
7-12 <8.0 0- 6 7.5-8.5   
American Diabetes   
Association. Diabetes   
Care 33(S1), 2010.  
Estimated Average   
Kboujuk41 MG/DL  
C Reactive Protein,   
Ztirg63Oxc6349   
12:01PMAbbas, [facility]  
Test   
NameResultFlagReference  
C Reactive Protein,   
Serum1.93 mg/dLA  
REF VALUE < 1.00  
Comprehensive Metabolic   
Fnoub07Viz3288   
12:01PMAbbas, d  
Test   
NameResultFlagReference  
Glucose, Qwnvk754   
mg/dLH74 - 99  
Sodium, Gkkmm390   
mmol/L136 - 145  
POTASSIUM4.4 mmol/L3.5 -   
5.3  
Chloride, Gvpgy828   
mmol/L98 - 107  
Bicarbonate, Serum25   
mmol/L21 - 32  
Anion Gap, Serum12   
mmol/L10 - 20  
Blood Urea Nitrogen,   
Serum13 mg/dL6 - 23  
CREATININE0.55 mg/dLSee   
Below  
Reference Range: 0.50 -   
1.05  
Calcium, Serum8.9   
mg/dL8.6 - 10.3  
Albumin, Serum4.0 g/dL3.4   
- 5.0  
ALKALINE YVWZZLJCGCH48   
U/L33 - 110  
Protein, Total Serum6.5   
g/dL6.4 - 8.2  
Bilirubin, Serum Total0.3   
mg/dL0.0 - 1.2  
ALT (SGPT), Serum12 U/L7   
- 45  
Patients treated with   
Sulfasalazine may   
generate falsely   
decreased results for   
ALT.  
GFR Non    
American>60   
mL/min/1.73m2>60  
GFR >60   
mL/min/1.73m2>60  
CALCULATIONS OF ESTIMATED   
GFR ARE PERFORMED USING   
THE MDRD STUDY EQUATION   
FOR THE IDMS-TRACEABLE   
CREATININE METHODS. CLIN   
CHEM 2007;53:766-72  
AST13 U/L9 - 39  
Iron + TIBC,   
Ajpbn01Bti5956   
12:01PMAbbas, jjahid  
Test   
NameResultFlagReference  
Iron, Serum31 ug/dLL35 -   
150  
Total Iron Binding   
Ccnpsxds429 ug/dL240 -   
445  
% Saturation9 %L25 - 45  
Lipid Ivblj47Ecg0815   
12:01PMLeydi Luong  
Test   
NameResultFlagReference  
Cholesterol, Ofrvq585   
mg/dL0 - 199  
. AGE DESIRABLE   
BORDERLINE HIGH HIGH 0-19   
Y 0 - 169 170 - 199 >/=   
200 20-24 Y 0 - 189 190 -   
224 >/= 225 >24 Y 0 - 199   
200 - 239 >/= 240 **All   
ranges are based on   
fasting samples. Specific   
therapeutic targets will   
vary based on   
patient-specific cardiac   
risk. . Pediatric   
guidelines   
reference:Pediatrics   
2011, 128(S5). Adult   
guidelines reference:   
NCEP ATPIII Guidelines,   
TRINY 2001, 258:2486-97 .   
Venipuncture immediately   
after or during the   
administration of   
Metamizole may lead to   
falsely low results.   
Testing should be   
performed immediately   
prior to Metamizole   
dosing.  
HDL Cholesterol,   
Serum45.7 mg/dL  
. AGE VERY LOW LOW NORMAL   
HIGH 0-19 Y < 35 < 40   
40-45 ---- 20-24 Y ---- <   
40 >45 ---- >24 Y ---- <   
40 40-60 >60 .  
Cholesterol/HDL Ratio3.4  
REF VALUES DESIRABLE <   
3.4 HIGH RISK > 5.0  
LDL, Level96 mg/dL0 - 99  
. NEAR BORD AGE DESIRABLE   
OPTIMAL HIGH HIGH VERY   
HIGH 0-19 Y 0 - 109 ---   
110-129 >/= 130 ----   
20-24 Y 0 - 119 ---   
120-159 >/= 160 ---- >24   
Y 0 - 99 100-129 130-159   
160-189 >/=190 .  
VLDL, Serum13 mg/dL0 - 40  
Triglycerides, Serum64   
mg/dL0 - 149  
. AGE DESIRABLE   
BORDERLINE HIGH HIGH VERY   
HIGH 0 D-90 D 19 - 174   
---- ---- ---- 91 D- 9 Y   
0 - 74 75 - 99 >/= 100   
---- 10-19 Y 0 - 89 90 -   
129 >/= 130 ---- 20-24 Y   
0 - 114 115 - 149 >/= 150   
---- >24 Y 0 - 149 150 -   
199 200- 499 >/= 500 .   
Venipuncture immediately   
after or during the   
administration of   
Metamizole may lead to   
falsely low results.   
Testing should be   
performed immediately   
prior to Metamizole   
dosing.  
TSH - Thyroid Stimulating   
Hormone, Zddtg83Uud8914   
12:PMAbsKeongagandeep  
Test   
NameResultFlagReference  
Thyroid Stimulating   
Hormone, Serum1.49   
mIU/LSee Below  
Reference Range: 0.44 -   
3.98 TSH testing is   
performed using different   
testing methodology at   
[facility]   
than at other [facility]. Direct result   
comparisons should only   
be made within the same   
method.  
Vitamin D   
25-Gtvktfq72Ntw6663   
12:PMsRolfyanahi  
Test   
NameResultFlagReference  
Vitamin D 25-Hydroxy,   
Level27 ng/mLA  
. DEFICIENCY: < 20 NG/ML   
INSUFFICIENCY: 20-29   
NG/ML OPTIMUM LEVEL:   
30-80 NG/ML POSSIBLE   
TOXICITY: > 80 NG/ML THIS   
ASSAY ACCURATELY   
QUANTIFIES THE SUM OF   
VITAMIN D3, 25-HYDROXY   
AND VIT D2,25-HYDROXY.  
Vitamin B12,   
Lmwzg30Vez1905   
12:PM [facility]  
Test   
NameResultFlagReference  
Vitamin B12, Wldro801   
pg/mL211 - 911  
Folate, Bostm92Rlv0753   
12:, [facility]  
Test   
NameResultFlagReference  
Folate, Serum11.0   
ng/mL>5.0  
Patients receiving more   
than 5 mg/day of biotin   
may have interference in   
test results. A sample   
should be taken no sooner   
than eight hours after   
previous dose. Contact   
[phone] for   
additional information.  
Ferritin, Qlpfm08Kfl0811   
12: yanahigagandeep  
Test   
NameResultFlagReference  
Ferritin, Serum40 ug/L8 -   
150  
Parathormone Intact,   
Dcxkw94Frj1498   
12:, [facility]  
Test   
NameResultFlagReference  
Parathormone Intact,   
Serum33.6 pg/mLSee Below  
Reference Range: 18.5 -   
88.0 Patients receiving   
more than 5 mg/day of   
biotin may have   
interference in test   
results. A sample should   
be taken no sooner than   
eight hours after   
previous dose. Contact   
[phone] for   
additional information.  
Copper, Ngboz53Sni9583   
12:PMs, hi  
Test   
NameResultFlagReference  
Copper, Opodp311   
ug/hP64-900  
This test was developed   
and its performance   
characteristics   
determined by [facility]. It   
has not been cleared or   
approved by the Food and   
Drug Administration.   
Detection Limit = 5  
Zinc, Xmomq48For6302   
12:01PMAbbas, Leydi  
Test   
NameResultFlagReference  
Zinc, Serum75 ug/dA99-893  
This test was developed   
and its performance   
characteristics   
determined by [facility]. It   
has not been cleared or   
approved by the Food and   
Drug Administration.   
Detection Limit = 5  
Vitamin A, Sqbtf54Ssh1089   
12:01PMAbbas, Rolfjjahid  
Test   
NameResultFlagReference  
Vitamin A, Serum39.4   
ug/dL18.9-57.3  
Reference intervals for   
vitamin A determined from   
[facility] internal studies.   
Individuals with vitamin   
A less than 20 ug/dL are   
considered vitamin A   
deficient and those with   
serum concentrations less   
than 10 ug/dL are   
considered severely   
deficient. This test was   
developed and its   
performance   
characteristics   
determined by [facility]. It   
has not been cleared or   
approved by the Food and   
Drug Administration.  
Nicotine+Metabolites,   
Nrsjq50Fkp5078   
12:01PMAbbas, Rolfjjahid  
Test   
NameResultFlagReference  
NICOTINE<2 ng/mL  
Consistent with   
abstinence from   
nicotine-containing   
products for at least 1   
week. INTERPRETIVE   
INFORMATION: Nicotine and   
Metabolites, Serum or   
Plasma, Quantitative   
Methodology: Quantitative   
Liquid   
Chromatography-Tandem   
Mass Spectrometry   
Positive cutoff: 2 ng/mL   
For medical purposes   
only; not valid for   
forensic use. This test   
is designed to evaluate   
recent use of   
nicotine-containing   
products. Passive and   
active exposure cannot be   
discriminated   
definitively, although a   
cutoff of 10 ng/mL   
cotinine is frequently   
used for surgery   
qualification purposes.   
For smoking cessation   
programs or compliance   
testing, the absence of   
expected drug(s) and/or   
drug metabolite(s) may   
indicate non-compliance,   
inappropriate timing of   
specimen collection   
relative to drug   
administration, poor drug   
absorption, or   
limitations of testing.   
This test cannot   
distinguish between use   
of tobacco and purified   
nicotine products. The   
concentration value must   
be greater than or equal   
to the cutoff to be   
reported as positive.   
Test developed and   
characteristics   
determined by [facility]. See   
Compliance Statement B:   
[employer]CS Performed   
by [facility], [address] [place] [address],[place] [address]   
[phone]   
[email or web address], Alex Castellano MD - Lab.   
Director  
Cotinine, Level<2 ng/mL  
3-OH-Cotinine<2 ng/mL  
Vitamin B1 - Thiamine,   
Whole Gdwxi11Btc7711   
12:01PMLeydi Luong  
Test   
NameResultFlagReference  
Vitamin B1 - Thiamine   
Whole Blood91   
nmol/  
INTERPRETIVE INFORMATION:   
Vitamin B1, Whole Blood   
This assay measures the   
concentration of thiamine   
diphosphate (TDP), the   
primary active form of   
vitamin B1. Approximately   
90 percent of vitamin B1   
present in whole blood is   
TDP. Thiamine and   
thiamine monophosphate,   
which comprise the   
remaining 10 percent, are   
not measured. Test   
developed and   
characteristics   
determined by [facility]. See   
Compliance Statement B:   
[facility]/CS Performed   
by [facility], [address] [place] [address]   
[phone]   
[email or web address], Alex Castellano MD - Lab.   
Director  
Diagnoses/Problems  
GERD (gastroesophageal   
reflux disease) (530.81)   
(K21.9)  
Morbid obesity (278.01)   
(E66.01)  
We will run baseline   
blood tests  
Referral to bariatric   
surgery after review of   
test results  
Morbid obesity with BMI   
of 45.0-49.9, adult   
(278.01,V85.42)   
(E66.01,Z68.42)  
Vitamin D deficiency   
(268.9) (E55.9)  
Status post treatment  
Vitamin D level ordered  
Orders  
Start: Omeprazole 40 MG   
Oral Capsule Delayed   
Release; TAKE 1 CAPSULE   
Daily Open  
capsule, sprinkle in SF   
applesauce or pudding,   
swallow. DO NOT CHEW  
Rx By: Leydi Luong;   
Dispense: 0 Days ; #:30   
Capsule; Refill: 2;For:   
GERD (gastroesophageal   
reflux disease),   
Post-operative nausea and   
vomiting; BRYON = N;   
Verified Transmission to   
[employer]; Last Updated By:   
System, [employer];   
2019 8:01:05 AM  
Start: Gabapentin 100 MG   
Oral Capsule; Take 3   
capsules at bedtime   
starting 2 days  
before surgery. Take 1   
capsule 3 times daily   
after surgery, open   
capsule, take with SF  
pudding  
Rx By: Leydi Luong;   
Dispense: 0 Days ; #:27   
Capsule; Refill: 0;For:   
Post-operative nausea and   
vomiting; BRYON = N;   
Verified Transmission to   
[employer]; Last Updated By:   
System, [employer];   
2019 8:01:11 AM  
Start: Ondansetron 4 MG   
Oral Tablet   
Disintegrating; take 1-2   
tablets every 8 hours as  
needed for nausea  
Rx By: Leydi Luong;   
Dispense: 0 Days ; #:30   
Tablet; Refill: 2;For:   
Post-operative nausea and   
vomiting; BRYON = N;   
Verified Transmission to   
[facility]   
[id number]; Last Updated By:   
System, [employer];   
2019 8:01:05 AM  
Start: oxyCODONE HCl - 5   
MG/5ML Oral Solution;   
TAKE 5 ML Every 4 hours   
PRN pain  
alternate with tylenol   
and heating pad  
Rx By: Leydi Luong;   
Dispense: 6 Days ; #:180   
Milliliter; Refill:   
0;For: Post-operative   
nausea and vomiting; BRYON   
= N; Print Rx; Last   
Updated By: Toshia Coleman); 2019   
7:58:41 AM  
Provider Impressions  
Patient is ready for   
surgery, scheduled for   
laparoscopic gastric   
bypass.  
preop assessments   
reviewed, labs reviewed   
and no contraindications   
noted to proceed with   
surgery.  
RBAs discussed again.  
All questions answered.  
Periop care discussed.  
Consent obtained.  
Scripts given.  
Diet, exercise, life   
style modification for   
long term success   
reiterated.  
Vitamin supplementation   
and dietitian followup   
reiterated.  
Will proceed as planned.  
Risks including and not   
limited to bleeding,   
nausea, vomiting, weight   
regain, suboptimal weight   
loss, nutritional   
deficiencies, severe   
vitamin deficiencies,   
hair loss, lose skin,   
psychiatric   
issues,internal hernia,   
bowel obstruction, ulcer,   
inability to take certain   
medications, drinking   
problem and even death   
discussed and all   
question answered.  
  
Patient   
Discussion/Summary  
Surgery planned: a   
[place]-en-Y gastric bypass   
surgery.  
Pre Op Patient Discussion  
Reread your pre op manual   
to be familiar with pre   
op and post op   
expectations.  
Follow the pre op diet   
exactly so your liver   
shrinks down prior to   
your surgery.  
Obtain the over the   
counter items you need in   
the home prior to your   
surgery. Purchase a   
variety of clear and full   
liquids as your taste may   
differ after surgery.   
Freeze foods in ice cube   
trays so you have meals   
ready for after surgery.  
 your post op   
medications today!  
Be certain you have an   
appointment with your   
medical doctors who will   
need to tweak your   
prescription medications   
after surgery as you drop   
weight rapidly.  
Make sure you have follow   
up appointments 1-2 weeks   
after surgery and at the   
6 week [facility].  
Call phone # [phone]   
for any questions.  
  
Signatures  
Electronically signed by   
: Leydi Luong MD; Dec   
12 2019 10:56AM EST   
(Author)            Normal                                     
[employer]  
   
                                                    CBC AND DIFFERENTIALon    
   
                                                    % AUTOMATED IMMATURE   
GRAN            0.4 %           Normal          0.0 - 0.9       [facility]  
   
                                        Comment on above:   Result Comment: Perc  
ent differential counts (%) should be   
interpreted in the  
context of the absolute cell counts (cells/L).   
   
                                                            Performed By: #### C  
BCDF ####  
[address] [address]   
   
                                                    Basophils (Bld)   
[#/Vol]         0.03 10*3/uL    Normal          0.00 - 0.10     [facility]  
   
                                        Comment on above:   Performed By: #### C  
BCDF ####  
[address] [address]   
   
                                                    Basophils/100 WBC   
(Bld)           0.6 %           Normal          0.0 - 2.0       [facility]  
   
                                        Comment on above:   Performed By: #### C  
BCDF ####  
[address] [address]   
   
                                                    Eosinophils (Bld)   
[#/Vol]         0.17 10*3/uL    Normal          0.00 - 0.70     [facility]  
   
                                        Comment on above:   Performed By: #### C  
BCDF ####  
[address] [address]   
   
                                                    Eosinophils/100 WBC   
(Bld)           3.3 %           Normal          0.0 - 6.0       [facility]  
   
                                        Comment on above:   Performed By: #### C  
BCDF ####  
[address] [address]   
   
                                                    Erythrocyte   
distribution width   
(RBC) [Ratio]   12.6 %          Normal          11.5 - 14.5     [facility]  
   
                                        Comment on above:   Performed By: #### C  
BCDF ####  
[address] [address]   
   
                                                    Hematocrit (Bld)   
[Volume fraction] 37.7 %          Normal          36.0 - 46.0     [facility]  
   
                                        Comment on above:   Performed By: #### C  
BCDF ####  
[facility]  
[address] [place] [address]   
   
                                                    Hemoglobin (Bld)   
[Mass/Vol]      12.3 g/dL       Normal          12.0 - 16.0     [facility]  
   
                                        Comment on above:   Performed By: #### C  
BCDF ####  
[facility]  
[address] [facility] [place] [address]   
   
                                                    Lymphocytes (Bld)   
[#/Vol]         1.76 10*3/uL    Normal          1.20 - 4.80     [facility]  
   
                                        Comment on above:   Performed By: #### C  
BCDF ####  
[facility]  
[address] [place] [address], [place] [address]   
   
                                                    Lymphocytes/100 WBC   
(Bld)           34.4 %          Normal          13.0 - 44.0     [facility]  
   
                                        Comment on above:   Performed By: #### C  
BCDF ####  
[address] [place] [address]   
   
                      MCHC (RBC) [Mass/Vol] 32.6 g/dL  Normal     32.0 - 36.0 [facility]  
   
                                        Comment on above:   Performed By: #### C  
BCDF ####  
[facility]  
[address] [place] [address], [place] [address]   
   
                                                    MCV (RBC) [Entitic   
vol]            85 fL           Normal          80 - 100        [facility]  
   
                                        Comment on above:   Performed By: #### C  
BCDF ####  
[address] [place] [address]   
   
                                                    Monocytes (Bld)   
[#/Vol]         0.33 10*3/uL    Normal          0.10 - 1.00     [facility]  
   
                                        Comment on above:   Performed By: #### C  
BCDF ####  
[address] [place] [address]   
   
                                                    Monocytes/100 WBC   
(Bld)           6.4 %           Normal          2.0 - 10.0      [facility]  
   
                                        Comment on above:   Performed By: #### C  
BCDF ####  
[address]  
[place] [address]   
   
                                                    Neutrophils (Bld)   
[#/Vol]         2.81 10*3/uL    Normal          1.20 - 7.70     [facility]  
   
                                        Comment on above:   Performed By: #### C  
BCDF ####  
[facility]  
[address] [place] [address]   
   
                                                    Neutrophils/100 WBC   
(Bld)           54.9 %          Normal          40.0 - 80.0     [facility]  
   
                                        Comment on above:   Performed By: #### C  
BCDF ####  
[facility]  
[address] [place] [address]   
   
                                                    Nucleated RBC/100 WBC   
(Bld) [Ratio]   0.0 /100 WBC    Normal          0.0 - 0.0       [facility]  
   
                                        Comment on above:   Performed By: #### C  
BCDF ####  
[address] [address]   
   
                                                    Platelets (Bld)   
[#/Vol]         313 10*3/uL     Normal          150 - 450       [facility]  
   
                                        Comment on above:   Performed By: #### C  
BCDF ####  
[address] [address]   
   
                      RBC (Bld) [#/Vol] 4.43 x10E12/L Normal     4.00 - 5.20 [facility]  
   
                                        Comment on above:   Performed By: #### C  
BCDF ####  
[address] [address]   
   
                      WBC (Bld) [#/Vol] 5.1 10*3/uL Normal     4.4 - 11.3 [facility]  
   
                                        Comment on above:   Performed By: #### C  
BCDF ####  
[address] [address]   
   
                                                    COMPREHENSIVE PANELon 2019   
   
                      Albumin [Mass/Vol] 4.1 g/dL   Normal     3.4 - 5.0  [facility]  
   
                                        Comment on above:   Performed By: #### C  
MP ####  
[address] [address]   
   
                                                    ALP [Catalytic   
activity/Vol]   64 U/L          Normal          33 - 110        [facility]  
   
                                        Comment on above:   Performed By: #### C  
MP ####  
[address] [address]   
   
                                                    ALT [Catalytic   
activity/Vol]   10 U/L          Normal          7 - 45          [facility]  
   
                                        Comment on above:   Result Comment: Hillary  
ents treated with Sulfasalazine may   
generate  
falsely decreased results for ALT.   
   
                                                            Performed By: #### C  
MP ####  
[address] [address]   
   
                      Anion gap [Moles/Vol] 14 mmol/L  Normal     10 - 20    [facility]  
   
                                        Comment on above:   Performed By: #### C  
MP ####  
[address] [address]   
   
                                                    AST [Catalytic   
activity/Vol]   10 U/L          Normal          9 - 39          [facility]  
   
                                        Comment on above:   Performed By: #### C  
MP ####  
[address] [address]   
   
                      Bilirubin [Mass/Vol] 0.4 mg/dL  Normal     0.0 - 1.2  [facility]  
   
                                        Comment on above:   Performed By: #### C  
MP ####  
[address] [address]   
   
                      Calcium [Mass/Vol] 9.3 mg/dL  Normal     8.6 - 10.3 [facility]  
   
                                        Comment on above:   Performed By: #### C  
MP ####  
[address] [address]   
   
                      Chloride [Moles/Vol] 103 mmol/L Normal     98 - 107   [facility]  
   
                                        Comment on above:   Performed By: #### C  
MP ####  
[address] [address]   
   
                      Creatinine [Mass/Vol] 0.54 mg/dL Normal     0.50 - 1.05 [facility]  
   
                                        Comment on above:   Performed By: #### C  
MP ####  
[address] [address]   
   
                      GFR- AM. >60        Normal     >60        [facility]  
   
                                        Comment on above:   Result Comment: CALC  
ULATIONS OF ESTIMATED GFR ARE PERFORMED  
USING THE MDRD STUDY EQUATION FOR THE  
IDMS-TRACEABLE CREATININE METHODS.  
CLIN CHEM 2007;53:766-72   
   
                                                            Performed By: #### C  
MP ####  
[address] [address]   
   
                      GFR-NON  AM. >60        Normal     >60        [facility]  
   
                                        Comment on above:   Performed By: #### C  
MP ####  
[address] [address]   
   
                      Glucose [Mass/Vol] 83 mg/dL   Normal     74 - 99    [facility]  
   
                                        Comment on above:   Performed By: #### C  
MP ####  
[facility]  
[address] [place] [address], [place] [address]   
   
                                                    HCO3 (Bld)   
[Moles/Vol]     24 mmol/L       Normal          21 - 32         [facility]  
   
                                        Comment on above:   Performed By: #### C  
MP ####  
[address] [place] [address]   
   
                      Potassium [Moles/Vol] 4.1 mmol/L Normal     3.5 - 5.3  [facility]  
   
                                        Comment on above:   Performed By: #### C  
MP ####  
[address] [place] [address]   
   
                      Protein [Mass/Vol] 6.5 g/dL   Normal     6.4 - 8.2  [facility]  
   
                                        Comment on above:   Performed By: #### C  
MP ####  
[address] [place] [address]   
   
                      Sodium [Moles/Vol] 137 mmol/L Normal     136 - 145  [facility]  
   
                                        Comment on above:   Performed By: #### C  
MP ####  
[address] [place] [address]   
   
                                                    Urea nitrogen   
[Mass/Vol]      23 mg/dL        Normal          6 - 23          [facility]  
   
                                        Comment on above:   Performed By: #### C  
MP ####  
[address] [place] [address]   
   
                                                    PT/INRon 2019   
   
                                                    INR Coag (PPP)   
[Relative time] 1.1 {INR}       Normal          0.9 - 1.1       [facility]  
   
                                        Comment on above:   Performed By: #### P  
TINR ####  
[address] [place] [address]   
   
                      PT Coag (PPP) [Time] 12.5 s     Normal     9.7 - 12.7 [facility]  
   
                                        Comment on above:   Performed By: #### P  
TINR ####  
[address] [place] [address]   
   
                                                    TYPE + SCREENon 2019   
   
                      ABO TYPE   B          Normal                [facility]  
   
                                        Comment on above:   Performed By: #### T  
+S ####  
[address] [place] [address]   
   
                      RH TYPE    Positive   Normal                [facility]  
   
                                        Comment on above:   Performed By: #### T  
+S ####  
[facility]  
[address]  
[place] [address]   
   
                                                    UA MICROSCOPICon 2019   
   
                      BACTERIA   1+ /HPF    Abnormal              [facility]  
   
                                        Comment on above:   Performed By: #### U  
AMIC ####  
[facility]  
[address]  
[place] [address]   
   
                      HYALINE CAST OCC        Abnormal              [facility]  
   
                                        Comment on above:   Performed By: #### U  
AMIC ####  
[facility]  
[address]  
[place] [address]   
   
                      MUCUS      3+ /LPF    Normal                [facility]  
   
                                        Comment on above:   Performed By: #### U  
AMIC ####  
[facility]  
[address]  
[place] [address]   
   
                      RBC        1 /HPF     Normal     0-5        [facility]  
   
                                        Comment on above:   Performed By: #### U  
AMIC ####  
[facility]  
[address] [place]  
[place] [address]   
   
                      SQUAMOUS EPITH. CELLS 7 /HPF     Normal                [facility]  
   
                                        Comment on above:   Performed By: #### U  
AMIC ####  
[facility]  
[address]  
[place] [address]   
   
                      WBC        3 /HPF     Normal     0-5        [facility]  
   
                                        Comment on above:   Performed By: #### U  
AMIC ####  
[facility]  
[address]  
[place] [address]   
   
                                                    URINALYSISon 2019   
   
                      Appearance (U) HAZY       Normal     CLEAR      [facility]  
   
                                        Comment on above:   Performed By: #### U  
A ####  
[facility]  
[address]  
[place] [address]   
   
                                                    Bilirubin (U)   
[Mass/Vol]      Negative        Normal          NEGATIVE        [facility]  
   
                                        Comment on above:   Performed By: #### U  
A ####  
[facility]  
[address]  
[place] [address]   
   
                      BLOOD      Negative   Normal     NEGATIVE   [facility]  
   
                                        Comment on above:   Performed By: #### U  
A ####  
[facility]  
[address] [place]  
[place] [address]   
   
                          Color (U)    YELLOW       Normal       STRAW,YELLO  
W                                       [facility]  
   
                                        Comment on above:   Performed By: #### U  
A ####  
[facility]  
[address]  
[place] [address]   
   
                      Glucose [Mass/Vol] Negative   Normal     NEGATIVE   [facility]  
   
                                        Comment on above:   Performed By: #### U  
A ####  
[address] [address]   
   
                      Ketones Ql (U) Negative   Normal     NEGATIVE   [facility]  
   
                                        Comment on above:   Performed By: #### U  
A ####  
[address] [address]   
   
                                                    Leukocyte esterase   
Test strip Ql (U) MODERATE (2+)   Abnormal        NEGATIVE        [facility]  
   
                                        Comment on above:   Performed By: #### U  
A ####  
[address] [address]   
   
                      Nitrite Ql (U) Negative   Normal     NEGATIVE   [facility]  
   
                                        Comment on above:   Performed By: #### U  
A ####  
[address] [place] [address]   
   
                      pH (Bld)   6.0        Normal     5.0 - 8.0  [facility]  
   
                                        Comment on above:   Performed By: #### U  
A ####  
[address] [address]   
   
                                                    Protein (U)   
[Mass/Vol]      30 (1+)         Abnormal        NEGATIVE        [facility]  
   
                                        Comment on above:   Performed By: #### U  
A ####  
[address] [address]   
   
                                                    Specific gravity (U)   
[Rel density]       1.028               Normal              1.005 -   
1.035                                   [facility]  
   
                                        Comment on above:   Performed By: #### U  
A ####  
[address] [address]   
   
                      Urobilinogen Qn (U) <2.0       Normal     0.0 - 1.9  [facility]  
   
                                        Comment on above:   Performed By: #### U  
A ####  
[address] [place] [address]   
   
                                                    URINE CULTURE,BACTERIALon    
   
                                                    URINE   
CULTURE,BACTERIAL                       PATIENT: SHARON FISCHER MRN: [id number] LOCATION:   
[facility]  
BILL#: [id number] :   
78 AGE: SEX: F  
  
ORDER#: [id number]   
ORDERED BY: LEYDI LUONG  
SOURCE: URINE COLLECTED:   
19 09:55  
ANTIBIOTICS AT [facility].:   
RECEIVED : 19 19:28  
SITE:  
  
[facility]  
  
URINE CULTURE,BACTERIAL   
FINAL 19 12:34  
  
MIXED URETHRAL DIGNA. Normal                                  [facility]  
   
                                        Comment on above:   Performed By: #### U  
[facility] ####  
[facility]  
[address]  
[place] [address]   
   
                                                    Dietition Noteon 10-   
   
                                        Dietition Note      Chief Complaint  
  
obesity  
[facility]  
  
Active Problems  
AMA (advanced maternal   
age) multigravida 35+   
(659.63) (O09.529)  
Chronic daily headache   
(784.0) (R51)  
Common migraine without   
aura (346.10) (G43.009)  
Disorder of iron   
metabolism, unspecified   
(275.09) (E83.10)  
Encounter for   
pre-bariatric surgery   
counseling and education   
(V65.49) (Z71.89)  
Reviewed recent labs  
Paperwork completed  
Encounter for special   
screening examination for   
nutritional disorder   
(V77.99) (Z13.21)  
Excessive daytime   
sleepiness (780.54)   
(G47.19)  
Foot injury, left,   
initial encounter (959.7)   
(S99.922A)  
GERD (gastroesophageal   
reflux disease) (530.81)   
(K21.9)  
Gestational diabetes   
mellitus (648.80)   
(O24.419)  
Left ankle sprain   
(845.00) (S93.402A)  
Migraine (346.90)   
(G43.909)  
Has been quiescent for   
now  
Morbid obesity (278.01)   
(E66.01)  
We will run baseline   
blood tests  
Referral to bariatric   
surgery after review of   
test results  
Morbid obesity with BMI   
of 45.0-49.9, adult   
(278.01,V85.42)   
(E66.01,Z68.42)  
KATHY (obstructive sleep   
apnea) (327.23) (G47.33)  
Pre-bariatric surgery   
nutrition evaluation   
(V65.3) (Z71.3)  
Screening for diabetes   
mellitus (V77.1) (Z13.1)  
A1c  
Sprain of foot, left,   
initial encounter   
(845.10) (S93.602A)  
Vitamin D deficiency   
(268.9) (E55.9)  
Status post treatment  
Vitamin D level ordered  
Past Medical History  
History of anxiety   
(V11.8) (Z86.59)  
History of vitamin D   
deficiency (V12.1)   
(Z86.39)  
History of Migraine   
headache (346.90)   
(G43.909)  
Surgical History  
History of Dilation And   
Curettage  
Family History  
Family history of   
diabetes mellitus (V18.0)   
(Z83.3)  
Family history of   
cerebrovascular accident   
(CVA) (V17.1) (Z82.3)  
Social History  
Former smoker (V15.82)   
(Z87.891)  
No alcohol use  
No caffeine use  
Non-smoker (V49.89)   
(Z78.9)  
Allergies  
sulfa  
Recorded By: Karen Day; 10/7/2014   
11:07:12 AM  
Current Meds  
Iron 325 (65 Fe) MG Oral   
Tablet; TAKE 1 TABLET   
Twice daily 2 hrs   
separate from  
calcium, take with MVI;  
Therapy: 35Qcp0726 to   
(Evaluate:32Hhx3837)   
Requested for: 41Dnz6491;   
Last  
Rx:[id number] Ordered  
Rx By: Leydi Luong;   
Dispense: 30 Days ; #:60   
Tablet; Refill: 2;For:   
Disorder of iron   
metabolism, unspecified;   
BRYON = N; Verified   
Transmission to [facility] [address]  
Vitamin C Oral Tablet   
Chewable; TAKE 1 TABLET 3   
times daily Take 1 tablet   
with your  
Iron each time;  
Therapy: 09Qsu5071 to   
(Evaluate:41Trh0593)   
Requested for: 11Ieq7717;   
Last  
Rx:80Cey5746 Ordered  
Rx By: Leydi Luong;   
Dispense: 30 Days ; #:90   
Tablet; Refill: 5;For:   
Disorder of iron   
metabolism, unspecified;   
BRYON = N; Verified   
Transmission to [facility] [address]; Msg to   
Pharmacy: 500mg dose   
chewables  
Omeprazole 40 MG Oral   
Capsule Delayed Release;   
TAKE 1 CAPSULE Daily;  
Therapy: 89Ihf5265 to   
(Last Rx:83Jqa1761)   
Requested for: 78Yig4458   
Ordered  
Rx By: Leydi Luong;   
Dispense: 0 Days ; #:30   
Capsule; Refill: 2;For:   
GERD (gastroesophageal   
reflux disease); BRYON = N;   
Verified Transmission to   
[facility]   
[address]  
Ibuprofen 200 MG Oral   
Tablet; 5 tablets daily;  
Therapy:   
(Recorded:2016) to   
Recorded  
Dispense: 0 Days ; #:   
Sufficient Tablet;   
Refill: 0; BRYON = N;   
Record; Last Updated By:   
Mariana Tipton;   
2016 10:10:18 AM  
Provider Impressions  
Patient Name: Kelly Fischer   
Patient : 1980  
Insurance Company:   
[employer]   
Required Visits: 3   
Consecutive Visit Number:  
Date: 10/28/2019  
S: Pt reports that she is   
following her meal plan.   
She states that she is   
keeping a food diary but   
failed to bring it to her   
apt today. pt states that   
she is eating a cup of   
nuts every day. She   
doesn't read her meal   
plan for portion sizes.   
Review of dinner meals   
show that they are high   
in fat and calories. Pt   
denies doing any   
exercise.  
Advised pt to read over   
her meal plan again   
paying closer attention   
to appropriate portion   
sizes. advised to d/c   
aissatou bar as it is too   
high in sugar. suggested   
eating a turkey sandwich   
on a high fiber sandwich   
thin for breakfast   
instead. Advised to pick   
leaner meats for dinner.   
Also advised to start   
exercising again.  
recommend that pt   
continue to follow up   
with RD until ready for   
surgery. reminded pt to   
bring food logs with her   
at next visit.  
O: Wt: 266.3 lb Ht: 63.0   
in BMI: 47.2  
Goal: 5% body weight loss   
over the course of   
program  
Dietary recommendation:  
1. Continue to drink 64   
oz of low calorie   
beverages.  
2. Continue to practice   
the 30-30-30 rule by   
drinking between meals.  
3. follow your meal plan.   
Structure your meal plan   
- have 3 meals and 1   
snack daily.  
4. Have balanced meals   
that always contain a   
good source of protein.   
Nuts are not protein.  
5. Increase intake of   
non-starchy vegetables.   
limit bananas. They   
should be small about the   
lengh of a pen. .  
6. Start exercising.   
Increase physical   
activity by 10-15 minutes   
to an end goal of 60   
minutes 5 x per week.  
Behavioral   
recommendation: Pt will   
be able to plan balanced   
meals and grocery shop in   
a way that allows protein   
recommendations to be met   
while minimizing impulse   
buying, food waste, and   
cost.  
A/P: Pt appears to have a   
good understanding of how   
to meal plan and grocery   
shop defensively. Ms. Fischer has a goal to plan   
menus in advance, avoid   
the high cost, low   
nutirtional value foods   
like sweets and fast   
food, and redirect food   
budget to high protein,   
high nutritional value   
foods.  
Group Topic: Healthy   
Grocery Shopping on a   
Budget  
Exercise: none  
RD Signature: Caitlyn Betts RD, ELADIA BECKHAM Signature: Undecided -   
Any, BISHOP  
  
Patient   
Discussion/Summary  
Dietary recommendation:  
1. Continue to drink 64   
oz of low calorie   
beverages.  
2. Continue to practice   
the 30-30-30 rule by   
drinking between meals.  
3. follow your meal plan.   
Structure your meal plan   
- have 3 meals and 1   
snack daily.  
4. Have balanced meals   
that always contain a   
good source of protein.   
Nuts are not protein.  
5. Increase intake of   
non-starchy vegetables.   
limit bananas. They   
should be small about the   
lengh of a pen..  
6. Start exercising.   
Increase physical   
activity by 10-15 minutes   
to an end goal of 60   
minutes 5 x per week.  
Behavioral   
recommendation: Pt will   
be able to plan balanced   
meals and grocery shop in   
a way that allows protein   
recommendations to be met   
while minimizing impulse   
buying, food waste, and   
cost.  
A/P: Pt appears to have a   
good understanding of how   
to meal plan and grocery   
shop defensively. Ms. Fischer has a goal to plan   
menus in advance, avoid   
the high cost, low   
nutirtional value foods   
like sweets and fast   
food, and redirect food   
budget to high protein,   
high nutritional value   
foods.  
Group Topic: Healthy   
Grocery Shopping on a   
Budget  
Exercise: none  
RD Signature: Caitlyn Betts RD, ELADIA BECKHAM Signature: Undecided -   
Any, FACS  
  
  
Signatures  
Electronically signed by   
: Caitlyn Betts RD; Oct 28   
2019 2:53PM EST (Author) Normal                                     
[employer]  
   
                                                    Dietition Noteon 2019   
   
                                        Dietition Note      Chief Complaint  
  
obesity  
[facility]  
  
Active Problems  
AMA (advanced maternal   
age) multigravida 35+   
(659.63) (O09.529)  
Chronic daily headache   
(784.0) (R51)  
Common migraine without   
aura (346.10) (G43.009)  
Disorder of iron   
metabolism, unspecified   
(275.09) (E83.10)  
Encounter for   
pre-bariatric surgery   
counseling and education   
(V65.49) (Z71.89)  
Reviewed recent labs  
Paperwork completed  
Encounter for special   
screening examination for   
nutritional disorder   
(V77.99) (Z13.21)  
Excessive daytime   
sleepiness (780.54)   
(G47.19)  
Foot injury, left,   
initial encounter (959.7)   
(S99.922A)  
GERD (gastroesophageal   
reflux disease) (530.81)   
(K21.9)  
Gestational diabetes   
mellitus (648.80)   
(O24.419)  
Left ankle sprain   
(845.00) (S93.402A)  
Migraine (346.90)   
(G43.909)  
Has been quiescent for   
now  
Morbid obesity (278.01)   
(E66.01)  
We will run baseline   
blood tests  
Referral to bariatric   
surgery after review of   
test results  
Morbid obesity with BMI   
of 45.0-49.9, adult   
(278.01,V85.42)   
(E66.01,Z68.42)  
KATHY (obstructive sleep   
apnea) (327.23) (G47.33)  
Pre-bariatric surgery   
nutrition evaluation   
(V65.3) (Z71.3)  
Screening for diabetes   
mellitus (V77.1) (Z13.1)  
A1c  
Sprain of foot, left,   
initial encounter   
(845.10) (S93.602A)  
Vitamin D deficiency   
(268.9) (E55.9)  
Status post treatment  
Vitamin D level ordered  
Past Medical History  
History of anxiety   
(V11.8) (Z86.59)  
History of vitamin D   
deficiency (V12.1)   
(Z86.39)  
History of Migraine   
headache (346.90)   
(G43.909)  
Surgical History  
History of Dilation And   
Curettage  
Family History  
Family history of   
diabetes mellitus (V18.0)   
(Z83.3)  
Family history of   
cerebrovascular accident   
(CVA) (V17.1) (Z82.3)  
Social History  
Former smoker (V15.82)   
(Z87.891)  
No alcohol use  
No caffeine use  
Non-smoker (V49.89)   
(Z78.9)  
Allergies  
sulfa  
Recorded By: Karen Day; 10/7/2014   
11:07:12 AM  
Current Meds  
Iron 325 (65 Fe) MG Oral   
Tablet; TAKE 1 TABLET   
Twice daily 2 hrs   
separate from  
calcium, take with MVI;  
Therapy: 84Stm0219 to   
(Evaluate:20Xjp4076)   
Requested for: 70Fuc1036;   
Last  
Rx:[id number] Ordered  
Rx By: Leydi Luong;   
Dispense: 30 Days ; #:60   
Tablet; Refill: 2;For:   
Disorder of iron   
metabolism, unspecified;   
BRYON = N; Verified   
Transmission to [facility] [address]  
Vitamin C Oral Tablet   
Chewable; TAKE 1 TABLET 3   
times daily Take 1 tablet   
with your  
Iron each time;  
Therapy: 29Wtq7806 to   
(Evaluate:02Anf6122)   
Requested for: 23Czp4846;   
Last  
Rx:53Ves5588 Ordered  
Rx By: Leydi Luong;   
Dispense: 30 Days ; #:90   
Tablet; Refill: 5;For:   
Disorder of iron   
metabolism, unspecified;   
BRYON = N; Verified   
Transmission to [facility] [address]; Msg to   
Pharmacy: 500mg dose   
chewables  
Omeprazole 40 MG Oral   
Capsule Delayed Release;   
TAKE 1 CAPSULE Daily;  
Therapy: 26Ezq7234 to   
(Last Rx:01Phi2234)   
Requested for: 58Blb1894   
Ordered  
Rx By: Leydi Luong;   
Dispense: 0 Days ; #:30   
Capsule; Refill: 2;For:   
GERD (gastroesophageal   
reflux disease); BRYON = N;   
Verified Transmission to   
[facility]   
[address]  
Ibuprofen 200 MG Oral   
Tablet; 5 tablets daily;  
Therapy:   
(Recorded:2016) to   
Recorded  
Dispense: 0 Days ; #:   
Sufficient Tablet;   
Refill: 0; BRYON = N;   
Record; Last Updated By:   
Mariana Tipton;   
2016 10:10:18 AM  
Provider Impressions  
Patient Name: Kelly Fischer   
Patient : 1980  
Insurance Company:   
[facility]   
Required Visits: 3   
Consecutive Visit Number:  
Date: 2019  
S: Pt states that she is   
still following her meal   
plan and keeping her food   
diary. she is walking on   
most days of the week.  
O: Wt: 266.8 lb Ht: 63.0   
in BMI: 47.3  
Goal: 5% body weight loss   
over the course of   
program  
Dietary recommendation:  
1. Continue to drink 64   
oz of low calorie   
beverages daily.  
2. Continue to practice   
the 30-30-30 rule by   
drinking between meals.  
3. Follow your meal plan.   
Structure your meal plan   
- have 3 meals and 1   
snack daily.  
4. Have balanced meals   
that always contain a   
good source of protein.  
5. Increase physical   
activity by 10-15 minutes   
to an end goal of 60   
minutes 5 x per week.  
6. Be sure to read food   
labels. so that you are   
not eating more calories   
than you need.  
Behavioral   
recommendation: Pt is   
encouraged to choose a   
variety of foods to   
promote a diverse   
nutrient profile, as well   
as supplementing one   
complete multivitamin   
every day to complement   
the profile of oral   
intake.  
A/P: Pt appears to have a   
good understanding of the   
potential for   
micronutrient   
deficiencies and the   
importance of   
supplementing vitamins   
and minerals both pre-   
and post-weight loss   
surgery. Ms. Fischer has a   
goal to consume a   
balanced diet and   
supplement one   
multivitamin daily   
pre-op; secondary goal to   
prepare for post-op   
vitamin regimen.  
Group Topic: Vitamins AND   
Minerals  
Exercise: walking for 45   
min 5x/week  
RD Signature: Caitlyn Betts RD, LD MD Signature: Undecided -   
Any, FACS  
  
Patient   
Discussion/Summary  
Dietary recommendation:  
1. Continue to drink 64   
oz of low calorie   
beverages daily.  
2. Continue to practice   
the 30-30-30 rule by   
drinking between meals.  
3. Follow your meal plan.   
Structure your meal plan   
- have 3 meals and 1   
snack daily.  
4. Have balanced meals   
that always contain a   
good source of protein.  
5. Increase physical   
activity by 10-15 minutes   
to an end goal of 60   
minutes 5 x per week.  
6. Be sure to read food   
labels. so that you are   
not eating more calories   
than you need.  
Behavioral   
recommendation: Pt is   
encouraged to choose a   
variety of foods to   
promote a diverse   
nutrient profile, as well   
as supplementing one   
complete multivitamin   
every day to complement   
the profile of oral   
intake.  
A/P: Pt appears to have a   
good understanding of the   
potential for   
micronutrient   
deficiencies and the   
importance of   
supplementing vitamins   
and minerals both pre-   
and post-weight loss   
surgery. Ms. Fischer has a   
goal to consume a   
balanced diet and   
supplement one   
multivitamin daily   
pre-op; secondary goal to   
prepare for post-op   
vitamin regimen.  
Group Topic: Vitamins AND   
Minerals  
Exercise: walking for 45   
min 5x/week  
RD Signature: Caitlyn Betts RD, LD MD Signature: Undecided -   
Any, FACS  
  
  
Signatures  
Electronically signed by   
: Caitlyn Betts RD; Sep 30   
2019 1:48PM EST (Author) Normal                                     
[employer]  
   
                                                    Dietition Noteon 2019   
   
                                        Dietition Note      Chief Complaint  
  
obesity  
[facility]  
  
Active Problems  
AMA (advanced maternal   
age) multigravida 35+   
(659.63) (O09.529)  
Chronic daily headache   
(784.0) (R51)  
Common migraine without   
aura (346.10) (G43.009)  
Disorder of iron   
metabolism, unspecified   
(275.09) (E83.10)  
Encounter for   
pre-bariatric surgery   
counseling and education   
(V65.49) (Z71.89)  
Reviewed recent labs  
Paperwork completed  
Encounter for special   
screening examination for   
nutritional disorder   
(V77.99) (Z13.21)  
Excessive daytime   
sleepiness (780.54)   
(G47.19)  
Foot injury, left,   
initial encounter (959.7)   
(S99.922A)  
GERD (gastroesophageal   
reflux disease) (530.81)   
(K21.9)  
Gestational diabetes   
mellitus (648.80)   
(O24.419)  
Left ankle sprain   
(845.00) (S93.402A)  
Migraine (346.90)   
(G43.909)  
Has been quiescent for   
now  
Morbid obesity (278.01)   
(E66.01)  
We will run baseline   
blood tests  
Referral to bariatric   
surgery after review of   
test results  
Morbid obesity with BMI   
of 45.0-49.9, adult   
(278.01,V85.42)   
(E66.01,Z68.42)  
Pre-bariatric surgery   
nutrition evaluation   
(V65.3) (Z71.3)  
Screening for diabetes   
mellitus (V77.1) (Z13.1)  
A1c  
Snoring (786.09) (R06.83)  
Sprain of foot, left,   
initial encounter   
(845.10) (S93.602A)  
Vitamin D deficiency   
(268.9) (E55.9)  
Status post treatment  
Vitamin D level ordered  
Past Medical History  
History of anxiety   
(V11.8) (Z86.59)  
History of vitamin D   
deficiency (V12.1)   
(Z86.39)  
History of Migraine   
headache (346.90)   
(G43.909)  
Surgical History  
History of Dilation And   
Curettage  
Family History  
Family history of   
diabetes mellitus (V18.0)   
(Z83.3)  
Family history of   
cerebrovascular accident   
(CVA) (V17.1) (Z82.3)  
Social History  
Former smoker (V15.82)   
(Z87.891)  
No alcohol use  
No caffeine use  
Non-smoker (V49.89)   
(Z78.9)  
Allergies  
sulfa  
Recorded By: Karen Day; 10/7/2014   
11:07:12 AM  
Current Meds  
Iron 325 (65 Fe) MG Oral   
Tablet; TAKE 1 TABLET   
Twice daily 2 hrs   
separate from  
calcium, take with MVI;  
Therapy: 16Sqn2085 to   
(Evaluate:65Nmr0697)   
Requested for: 2019;   
Last  
Rx:[id number] Ordered  
Rx By: Leydi Luong;   
Dispense: 30 Days ; #:60   
Tablet; Refill: 2;For:   
Disorder of iron   
metabolism, unspecified;   
BRYON = N; Verified   
Transmission to [facility] [address]  
Vitamin C Oral Tablet   
Chewable; TAKE 1 TABLET 3   
times daily Take 1 tablet   
with your  
Iron each time;  
Therapy: 30Zxe0839 to   
(Evaluate:30Abz9017)   
Requested for: 2019;   
Last  
Rx:[id number] Ordered  
Rx By: Leydi Luong;   
Dispense: 30 Days ; #:90   
Tablet; Refill: 5;For:   
Disorder of iron   
metabolism, unspecified;   
BRYON = N; Verified   
Transmission to [facility] [address]846; Msg to   
Pharmacy: 500mg dose   
chewables  
Omeprazole 40 MG Oral   
Capsule Delayed Release;   
TAKE 1 CAPSULE Daily;  
Therapy: 02Cjd9263 to   
(Last Rx:[id number])   
Requested for: 2019   
Ordered  
Rx By: Leydi Luong;   
Dispense: 0 Days ; #:30   
Capsule; Refill: 2;For:   
GERD (gastroesophageal   
reflux disease); BRYON = N;   
Verified Transmission to   
[facility]   
[address]  
Ibuprofen 200 MG Oral   
Tablet; 5 tablets daily;  
Therapy:   
(Recorded:2016) to   
Recorded  
Dispense: 0 Days ; #:   
Sufficient Tablet;   
Refill: 0; BRYON = N;   
Record; Last Updated By:   
Mariana Tipton;   
2016 10:10:18 AM  
Provider Impressions  
Patient Name: Kelly Fischer   
Patient : 1980  
Insurance Company:   
[employer]   
Required Visits: 3   
Consecutive Visit Number:  
Date: 2019  
S: Pt admits to stress   
eating. She is not   
following her meal plan.   
She is eating bags of   
peanuts  
O: Wt: 268.1 lb Ht: 63.0   
in BMI: 47.5  
Goal: 5% body weight loss   
over the course of   
program  
Dietary recommendation:  
1. Continue to drink 64   
oz of low calorie   
beverages daily.  
2. Continue to practice   
the 30-30-30 rule by   
drinking between meals.  
3. Follow your meal plan.   
Structure your meal plan   
- have 3 meals and 1   
snack daily.  
4. Have balanced meals   
that always contain a   
good source of protein.  
5. Increase intake of   
non-starchy vegetables.   
Have 5 servings fruits   
and vegetables daily.  
6. Increase physical   
activity by 10-15 minutes   
to an end goal of 60   
minutes 5 x per week.  
Behavioral   
recommendation:: Pt is   
encouraged to practice   
identifying the   
differences between   
emotional hunger and   
physical hunger and to   
implement distraction and   
confrontation techniques   
to avoid emotional   
eating.  
A/P: Pt appears to have a   
good understanding of how   
to characterize the two   
different types of   
hunger. Ms. Fischer has a   
goal to keep a food diary   
and to utilize at least   
two of the techniques   
learned in class to deter   
emotional eating. Pt will   
get back to following her   
meal plan. She will start   
the pre op diet 2 weeks   
before surgery.  
Group Topic: Emotional   
Eating  
Exercise: no planned   
exercise. walks at the   
zoo and yard work.  
RD Signature: Caitlyn Betts RD, ELADIA BECKHAM Signature: Undecided -   
Any, FACS  
  
Patient   
Discussion/Summary  
Dietary recommendation:  
1. Continue to drink 64   
oz of low calorie   
beverages daily.  
2. Continue to practice   
the 30-30-30 rule by   
drinking between meals.  
3. Follow your meal plan.   
Structure your meal plan   
- have 3 meals and 1   
snack daily.  
4. Have balanced meals   
that always contain a   
good source of protein.  
5. Increase intake of   
non-starchy vegetables.   
Have 5 servings fruits   
and vegetables daily.  
6. Increase physical   
activity by 10-15 minutes   
to an end goal of 60   
minutes 5 x per week.  
Behavioral   
recommendation:: Pt is   
encouraged to practice   
identifying the   
differences between   
emotional hunger and   
physical hunger and to   
implement distraction and   
confrontation techniques   
to avoid emotional   
eating.  
A/P: Pt appears to have a   
good understanding of how   
to characterize the two   
different types of   
hunger. Ms. Fischer has a   
goal to keep a food diary   
and to utilize at least   
two of the techniques   
learned in class to deter   
emotional eating. Pt will   
get back to following her   
meal plan. She will start   
the pre op diet 2 weeks   
before surgery.  
Group Topic: Emotional   
Eating  
Exercise: no planned   
exercise. walks at the   
zoo and yard work.  
RD Signature: Caitlyn Betts RD, ELADIA BECKHAM Signature: Undecided -   
Any, FACS  
  
  
Signatures  
Electronically signed by   
: Caitlyn Betts RD; Aug 28   
2019 10:43AM EST (Author) Normal                                     
[employer]  
   
                                                    FOOT; COMPLETE, MIN 3 VIEWSo  
n 2018   
   
                                                    FOOT; COMPLETE, MIN 3   
VIEWS                                   MRN: [id number]   
Name: SHARON FISCHER   
STUDY:LeftFOOT; COMPLETE,   
MIN 3 VIEWS; 2018   
6:15 pm   
INDICATION:Signs/Symptoms  
: Attn: Digits 3-5 +   
Metatarsals. Missed   
single stepand twisted   
foot today..   
COMPARISON:None.   
ACCESSION   
NUMBER(S):[id number]   
ORDERING   
CLINICIAN:ANUP ROBERTS   
FINDINGS:Bony structures:   
Intact Joint spaces:   
Maintained Soft tissues:   
Unremarkable without   
significant edema or   
radiodenseforeign body   
Other: None significant   
IMPRESSION:Unremarkable   
exam.Electronically   
signed by: CISCO LISA MD          Normal                                  [facility]  
   
                                                    CBCon 2018   
   
                                                    Erythrocyte   
distribution width   
Auto Ratio (RBC) 12.6 %          Normal          11.5 - 14.5     [facility]3]  
   
                                        Comment on above:   Performed By: #### C  
BC ####[facility]   
[place] [address]   
   
                      Erythrocytes (RBC) 4.62 x10E12/L Normal     4.00 - 5.20 [facility]3]  
   
                                        Comment on above:   Performed By: #### C  
BC ####[facility]   
[place] [address]   
   
                      Hematocrit (HCT) 38.2 %     Normal     36.0 - 46.0 [facility]  
   
                                        Comment on above:   Performed By: #### C  
BC ####[facility]   
[place] [address]   
   
                                                    Hemoglobin mass conc   
(Bld)           12.2 g/dL       Normal          12.0 - 16.0     [facility]3]  
   
                                        Comment on above:   Performed By: #### C  
BC ####[facility]   
[place] [address]   
   
                      MCHC mass conc (RBC) 31.9 g/dL  Low        32.0 - 36.0 [facility]3]  
   
                                        Comment on above:   Performed By: #### C  
BC ####[facility]   
[place] [address]   
   
                      MCV        83 fL      Normal     80 - 100   [facility]3]  
   
                                        Comment on above:   Performed By: #### C  
BC ####[facility]   
[place] [address]   
   
                                                    Nucleated   
erythrocytes    0.0 /100 WBC    Normal          0.0-0.0         [facility]3]  
   
                                        Comment on above:   Performed By: #### C  
BC ####[facility]   
[place] [address]   
   
                      Platelets  231 10*3/uL Normal     150 - 450  [facility]3]  
   
                                        Comment on above:   Performed By: #### C  
BC ####[facility]   
[place] [address]   
   
                      WBC (Leukocytes) 5.4 10*3/uL Normal     4.4 - 11.3 [facility]  
   
                                        Comment on above:   Performed By: #### C  
BC ####[facility]   
[place] [address]   
   
                                                    COMPREHENSIVE PANELon 2018   
   
                                                    Alanine   
aminotransferase   
(ALT)           10 U/L          Normal          7 - 45          [facility]  
   
                                        Comment on above:   Result Comment: Hillary  
ents treated with Sulfasalazine may   
generate   
falsely decreased results for ALT.   
   
                                                            Performed By: #### C  
MP ####[id number]   
[place] [address]   
   
                      Albumin    4.2 g/dL   Normal     3.4 - 5.0  [facility]  
   
                                        Comment on above:   Performed By: #### C  
MP ####[id number]   
[place] [address]   
   
                                                    Alkaline phosphatase   
(ALP)           64 U/L          Normal          33 - 110        [facility]  
   
                                        Comment on above:   Performed By: #### C  
MP ####[facility]   
[place] [address]   
   
                      Anion gap  15 mmol/L  Normal     10 - 20    [facility]  
   
                                        Comment on above:   Performed By: #### C  
MP ####[facility]   
[place] [address]   
   
                                                    Aspartate   
aminotransferase   
(AST)           12 U/L          Normal          9 - 39          [facility]  
   
                                        Comment on above:   Performed By: #### C  
MP ####[facility]   
[place] [address]   
   
                      Bicarbonate (HCO3) 27 mmol/L  Normal     21 - 32    [facility]  
   
                                        Comment on above:   Performed By: #### C  
MP ####[facility]   
[place] [address]   
   
                      Bilirubin (total) 0.4 mg/dL  Normal     0.0 - 1.2  [facility]  
   
                                        Comment on above:   Performed By: #### C  
MP ####[facility]   
[place] [address]   
   
                      Calcium    10.1 mg/dL Normal     8.6 - 10.6 [facility]  
   
                                        Comment on above:   Performed By: #### C  
MP ####[facility]   
[place] [address]   
   
                      Chloride   103 mmol/L Normal     98 - 107   [facility]  
   
                                        Comment on above:   Performed By: #### C  
MP ####[facility]   
[place] [address]   
   
                      Creatinine 0.61 mg/dL Normal     0.50 - 1.05 [facility]  
   
                                        Comment on above:   Performed By: #### C  
MP ####[facility]   
[place] [address]   
   
                      eGFR (non-black) mL/min/{1.73_m2} Normal     >60        [facility]  
   
                                        Comment on above:   Result Comment: CALC  
ULATIONS OF ESTIMATED GFR ARE PERFORMED   
USING THE MDRD STUDY EQUATION FOR THE IDMS-TRACEABLE CREATININE   
METHODS. CLIN CHEM 2007;53:766-72   
   
                                                            Performed By: #### C  
MP ####[id number]   
[place] [address]   
   
                      Glucose mass conc 85 mg/dL   Normal     74 - 99    [facility]  
   
                                        Comment on above:   Performed By: #### C  
MP ####[facility]   
[place] [address]   
   
                      Potassium molar conc 4.7 mmol/L Normal     3.5 - 5.3  [facility]  
   
                                        Comment on above:   Performed By: #### C  
MP ####[id number]   
[place] [address]   
   
                      Protein    7.3 g/dL   Normal     6.4 - 8.2  [facility]  
   
                                        Comment on above:   Performed By: #### C  
MP ####[facility]   
[place] [address]   
   
                      Sodium     140 mmol/L Normal     136 - 145  [facility]  
   
                                        Comment on above:   Performed By: #### C  
MP ####[facility]   
[place] [address]   
   
                      Urea nitrogen 18 mg/dL   Normal     6 - 23     [facility]  
   
                                        Comment on above:   Performed By: #### C  
MP ####[id number]   
[place] [address]   
   
                                                    HEMOGLOBIN A1Con 2018   
   
                      Glucose mass conc 111 mg/dL  Normal                [facility]  
   
                                        Comment on above:   Performed By: #### H  
BA1E ####[facility]   
[place] [address]   
   
                                                    Hemoglobin   
A1c/Hemoglobin.total   
mass fraction (Bld) 5.5 %           Normal                          [facility]  
   
                                        Comment on above:   Result Comment: Diag  
nosis of Diabetes-Adults Non-Diabetic: <   
or   
= 5.6% Increased risk for developing diabetes: 5.7-6.4%   
Diagnostic of diabetes: > or = 6.5%. Monitoring of Diabetes Age   
(y) Therapeutic Goal (%) Adults: >18 <7.0 Pediatrics: 13-18 <7.5   
7-12 <8.0 0- 6 7.5-8.5 American Diabetes Association. Diabetes   
Care 33(S1), 2010.   
   
                                                            Performed By: #### H  
BA1E ####[facility]   
[place] [address]   
   
                                                    LIPID PANEL NON-FASTINGon    
   
                      Cholesterol 175 mg/dL  Normal     0 - 199    [facility]  
   
                                        Comment on above:   Result Comment: . AG  
E DESIRABLE BORDERLINE HIGH HIGH 0-19 Y 0   
-   
169 170 - 199 >/= 200 20-24 Y 0 - 189 190 - 224 >/= 225 >24 Y 0   
- 199 200 - 239 >/= 240 **All ranges are based on fasting   
samples. Specific therapeutic targets will vary based on   
patient-specific cardiac risk.. Pediatric guidelines   
reference:Pediatrics 2011, 128(S5). Adult guidelines reference:   
NCEP ATPIII Guidelines, TRINY 2001, 258:2486-97. Venipuncture   
immediately after or during the administration of Metamizole may   
lead to falsely low results. Testing should be performed   
immediately prior to Metamizole dosing.   
   
                                                            Performed By: #### L  
IPIN ####[facility]   
[place] [address]   
   
                                                    Cholesterol to HDL   
Ratio           4.3 {ratio}     Normal                          [facility]  
   
                                        Comment on above:   Result Comment: REF   
VALUESDESIRABLE < 3.4HIGH RISK > 5.0   
   
                                                            Performed By: #### L  
IPIN ####[facility]   
[place] [address]   
   
                      HDL Cholesterol 40.7 mg/dL Normal                [facility]  
   
                                        Comment on above:   Result Comment: . AG  
E VERY LOW LOW NORMAL HIGH 0-19 Y < 35 <   
40   
40-45 ---- 20-24 Y ---- < 40 >45 ---- >24 Y ---- < 40 40-60 >60.   
   
                                                            Performed By: #### L  
IPIN ####[facility]   
[place] [address]   
   
                      NON-HDL CHOLESTEROL 134 mg/dL  Normal                [facility]  
   
                                        Comment on above:   Result Comment: AGE   
DESIRABLE BORDERLINE HIGH HIGH VERY HIGH   
0-19 Y 0 - 119 120 - 144 >/= 145 >/= 160 20-24 Y 0 - 149 150 -   
189 >/= 190 ---- >24 Y 30 MG/DL ABOVE LDL CHOLESTEROL GOAL.   
   
                                                            Performed By: #### L  
IPIN ####[facility]   
[place] [address]   
   
                                                    TSH WITH REFLEX TO FREE T4 I  
F ABNORMALon 2018   
   
                                                    Thyroid stimulating   
hormone (TSH)   2.23 m[IU]/L    Normal          0.44 - 3.98     [facility]  
   
                                        Comment on above:   Result Comment: TSH   
testing is performed using different   
testing   
methodology at [facility] than at other [facility]. Direct result comparisons should only be made within   
the same method.. Patients receiving more than 5 mg/day of   
biotin may have interference in test results. A sample should be   
taken no sooner than eight hours after previous dose. Contact   
[phone] for additional information.   
   
                                                            Performed By: #### T  
HYDS ####[facility]   
[place] [address]   
   
                                                    VITAMIN D, 25-HYDROXYon 03-3  
0-2018   
   
                      VITAMIN D, 25-HYDROXY 19 ng/mL   Abnormal              [facility]3]  
   
                                        Comment on above:   Result Comment: .DEF  
ICIENCY: < 20 NG/MLINSUFFICIENCY: 20-29   
NG/MLOPTIMUM LEVEL: 30-80 NG/MLPOSSIBLE TOXICITY: > 80 NG/MLTHIS   
ASSAY ACCURATELY QUANTIFIES THE SUM OFVITAMIN D3, 25-HYDROXY AND   
VIT D2,25-HYDROXY.   
   
                                                            Performed By: #### V  
TDOH ####[facility]   
[place] [address]   
  
  
  
Vital Signs  
  
  
                      Date Time  Vital Sign Value      Performing Clinician Kristine dubon  
   
                                                    2020   
11:      BMI (Body Mass Index) 39.45 kg/m2     Jana Denis [facility]  
[facility]   
[address]  
Work Phone:   
[phone]  
   
                                                    2020   
11:      Body weight     107.53 kg       Jana Mettawa [facility]  
a   
[address]  
Work Phone:   
[phone]  
   
                                                    2020   
11:      BP Diastolic    57 mm[Hg]       Jana Denis [facility]  
a   
[address]  
Work Phone:   
[phone]  
   
                                                    2020   
11:      BP Systolic     110 mm[Hg]      Jana Denis [facility]  
a   
[address]  
Work Phone:   
[phone]  
   
                                                    2020   
11:                              BSA (Body Surface   
Area)               2.13 m2             Jana Mettawa    [facility]   
[facility] [address]  
Work Phone:   
[phone]  
   
                                                    2020   
11:      Height          165.1 cm        Jana Mettawa [facility]  
a   
[address]  
Work Phone:   
[phone]  
   
                                                    2020   
11:      Pulse (Heart Rate) 69 /min         Jana Mettawa [facility]  
[facility]   
[facility]  
Work Phone:   
[phone]  
   
                                                    2019   
11:      Pulse (Heart Rate) 86 /min         Leydi Ingramas  [facility]  
[facility]   
[address]  
Work Phone:   
[phone]  
   
                                                    2019   
11:      BMI (Body Mass Index) 42.6 kg/m2      Murickd Abbas  [facility]  
[facility]   
[facility]  
Work Phone:   
[phone]  
   
                                                    2019   
11:      Body Temperature 97.8 [degF]     Murickd Juan Joseas  [facility]  
a   
[address]  
Work Phone:   
[phone]  
   
                                                    2019   
11:      Body weight     116.12 kg       Murickd Abbas  [facility]  
   
[facility] [address]  
Work Phone:   
[phone]  
   
                                                    2019   
11:      BP Diastolic    70 mm[Hg]       Mujluis migueld Abbas  [facility]  
   
[facility]  
Work Phone:   
[phone]  
   
                                                    2019   
11:      BP Systolic     104 mm[Hg]      Murickd Abbas  [facility]  
   
[facility] [address]  
Work Phone:   
[phone]  
   
                                                    2019   
11:                              BSA (Body Surface   
Area)               2.2 m2              Murickd Abbas      [facility]   
[facility] [address]  
Work Phone:   
[phone]  
   
                                                    2019   
11:      Height          165.1 cm        Murickd Abbas  [facility]  
   
[facility] [address]  
Work Phone:   
[phone]  
   
                                                    2019   
11:      Pulse (Heart Rate) 101 /min        Murickd Juan Joseas  [facility]  
[facility]   
[facility] [address]  
Work Phone:   
[phone]  
   
                                                    2019   
11:      Respiratory Rate 16 /min         Murickd Abbas  [facility]  
a   
[facility] [address]  
Work Phone:   
[phone]  
   
                                                    12-   
12:      BMI (Body Mass Index) 43.99 kg/m2     Leydi Ingramas  [facility]  
[facility]   
[facility] [address]  
Work Phone:   
[phone]  
   
                                                    2019   
12:      Body weight     119.92 kg       Leydi Abbas  [facility]  
   
[facility] [address]  
Work Phone:   
[phone]  
   
                                                    2019   
12:      BP Diastolic    75 mm[Hg]       Murickd Abbas  [facility]  
   
[facility] [address]  
Work Phone:   
[phone]  
   
                                                    2019   
12:      BP Systolic     133 mm[Hg]      Murickd Abbas  [facility]  
   
[facility] [address]  
Work Phone:   
[phone]  
   
                                                    2019   
12:                              BSA (Body Surface   
Area)               2.23 m2             Leydi Abbas      [facility]   
[facility] [address]  
Work Phone:   
[phone]  
   
                                                    2019   
12:      Height          165.1 cm        Leydi Ingramas  [facility]  
   
[facility] [address]  
Work Phone:   
[phone]  
   
                                                    2019   
12:      Pulse (Heart Rate) 72 /min         Leydi Ingramas  [facility]  
[facility]   
[facility] [address]  
Work Phone:   
[phone]  
  
  
  
Encounters  
  
  
                          Encounter Date Encounter Type Care Provider Facility  
   
                          Start: 2024 [facility] encounter              [facility]  
   
                          Start: 2023 [facility] encounter              [facility]  
   
                                                    Start: 05-  
End: 05-                         Subsequent hospital   
visit by physician                      Smiley Rodriguez MD  
Work Phone:   
[phone]                          [facility]  
   
                                        Comment on above:   Abnormal uterine ble  
eding (AUB)   
   
                                                    Start: 02-  
End: 02-                         Subsequent hospital   
visit by physician                      Erica Tompkins  
Work Phone:   
[phone]                          [facility]  
   
                                        Comment on above:   Arrived   
   
                                        Start: 2020   Patient encounter   
procedure                 Jana Mettawa          [facility]   
[address]  
Work Phone:   
[phone]  
   
                                        Start: 2019   Patient encounter   
procedure                 Jana Mettawa          [facility]   
[address]  
Work Phone:   
[phone]  
   
                                        Start: 2019   Patient encounter   
procedure                 Mudonnie Luong            [facility]   
[address]  
Work Phone:   
[phone]  
   
                                        Start: 2019   Patient encounter   
procedure                 Mudonnie Luong            [facility]   
[address]  
Work Phone:   
[phone]  
   
                                        Start: 10-   Patient encounter   
procedure                 Mujluis migueld Juan Joseas            [facility]   
303  
Work Phone:   
[phone]  
   
                                        Start: 10-   Patient encounter   
procedure                 Mudonnie Ingramas            [facility]   
[address]  
Work Phone:   
[phone]  
   
                                        Start: 2019   Patient encounter   
procedure                 Murickd Juan Joseas            [facility]   
[address]  
Work Phone:   
[phone]  
   
                                        Start: 2019   Patient encounter   
procedure                 Mudonnie Ingramas            [facility]   
[address]  
Work Phone:   
[phone]  
   
                                        Start: 2019   Patient encounter   
procedure                 Mudonnie Ingramas            [facility]   
[address]  
Work Phone:   
[phone]  
   
                                        Start: 2019   Patient encounter   
procedure                 Mudonnie Luong            [facility]   
[address]  
Work Phone:   
[phone]  
   
                                        Start: 2019   Patient encounter   
procedure                 Murickd Juan Joseas            [facility]   
[address]  
Work Phone:   
[phone]  
   
                                        Start: 2019   Patient encounter   
procedure                 Mujluis migueld Juan Joseas            [facility]   
303  
Work Phone:   
[phone]  
   
                                        Start: 2019   Patient encounter   
procedure                 Mujkathi Ingramas            [facility]   
[address]  
Work Phone:   
[phone]  
   
                                        Start: 2019   Patient encounter   
procedure                 Mujluis migueld Juan Joseas            [facility]   
[address]  
Work Phone:   
[phone]  
   
                                        Start: 2019   Patient encounter   
procedure                 Mujluis migueld Juan Joseas            [facility]   
303  
Work Phone:   
[phone]  
   
                                        Start: 2019   Patient encounter   
procedure                 Mudonnie Ingramas            [facility]   
303  
Work Phone:   
[phone]  
   
                                        Start: 02-   Patient encounter   
procedure                 Mudonnie Ingramas            [facility]   
[address]  
Work Phone:   
[phone]  
   
                                        Start: 02-   Patient encounter   
procedure                 Mudonnie Abbas            [facility]   
[address]  
Work Phone:   
[phone]  
   
                                        Start: 2019   Patient encounter   
procedure                 Mudonnie Ingramas            [facility]   
[address]  
Work Phone:   
[phone]  
   
                          Start: 2018 Ambulatory   [facility] Fac  
ility:8[facility]  
   
                                        Start: 2018   Patient encounter   
procedure                 Mudonnie Ingramas            [facility]   
[address]  
Work Phone:   
[phone]  
   
                                        Start: 2018   Patient encounter   
procedure                 Leydi Ingramas            [facility]   
[address]  
Work Phone:   
[phone]  
   
                                                    Start: 2017  
End: 2017                         Emergency department   
patient visit             UNKNOWN PROVIDER          Facility:[facility]  
  
  
  
Procedures  
  
  
                          Date         Procedure    Procedure Detail Performing   
Clinician  
   
                                        Start: 05-   Assay of thyroid sti  
mulating   
hormone tsh                                         Smiley Rodriguez MD  
Work Phone:   
[phone]  
   
                                        Start: 02-   Screening digital br  
east   
tomosynthesis bi                                    Erica Tompkins  
Work Phone:   
[phone]  
   
                          Start: 2020 Follow-up visit                
   
                          Start: 2019 Antibody screen                
   
                                        Comment on above:   Performed By: #### T  
+S ####  
[address] [place] [address]   
   
                          Start: 10- Follow-up visit                
   
                          Start: 2019 Follow-up visit                
   
                          Start: 2017 DISCHARGE PATIENT              [facility]  
WN PROVIDER  
   
                                        Start: 2017   Hereditary colon ca   
dsrdrs   
gen seq analys 10 gen                               UNKNOWN PROVIDER  
   
                                        Start: 2017   Iadna neisseria gono  
rrhoeae   
amplified probe tq                                  UNKNOWN PROVIDER  
   
                                        Start: 2017   Urine pregnancy test  
 visual   
color cmprsn meths                                  UNKNOWN PROVIDER  
   
                                        Start: 05-   Us pelvic nonobstetr  
ic   
real-time image complete                            UNKNOWN PROVIDER  
   
                                        Start: 2016   Microscopic observat  
ion   
[Identifier] in Cervix by   
Cyto stain                                            
   
                                                            History of Dilation   
And   
Curettage                                           Leydi Luong  
   
                                       [place] gastrojejunostomy                
Leydi Luong  
  
  
  
Plan of Treatment  
  
  
                          Date         Care Activity Detail       Author  
   
                                        Start: 2029   Shingles (RZV) Vacci  
ne   
(1 of 2)                                Shingles (RZV) Vaccine   
(1 of 2)                                [facility]  
   
                                        Start: 2026   DTaP/Tdap/Td vaccine  
   
(4 - Td)                                DTaP/Tdap/Td vaccine   
(4 - Td)                                [facility]  
Work Phone:   
[phone]  
   
                                Start: 2026 Tetanus vaccination Tetanus (T  
d or Tdap)   
Booster                                 [facility]  
   
                          Start: 2023 Influenza vaccination Influenza Vacc  
ine (#1) [facility]  
   
                          Start: 10- Influenza vaccination Influenza Vacc  
ine (#1) [facility]  
   
                                                    Start: 2021  
End: 2021                         Telemedicine   
consultation with   
patient                                 2021   
Telemedicine   
Obstetrics and   
Gynecology Smiley Rodriguez  [address]   
[place] [address]   
[phone] (Fax)                      [facility]  
   
                                                    Start: 2021  
End: 2021                         Patient encounter   
procedure                               2021 Procedure   
visit Obstetrics and   
Gynecology                              [facility]  
   
                                        Start: 2019   Screening for   
malignant neoplasm of   
cervix                    Pap Smear                 [facility]  
   
                          Start: 2019 Diabetes screen Diabetes screen [facility]  
A  
Work Phone:   
[phone]  
   
                          Start: 2019 Lipid panel  Lipid screen [facility]  
Work Phone:   
[phone]  
   
                                        Start: 2019   Screening for   
malignant neoplasm of   
breast                    Mammography               [facility]  
   
                                        Start: 2006   HPV Vaccine (optiona  
l   
start 27-45 years)                      HPV Vaccine (optional   
start 27-45 years)                      [facility]  
   
                                        Start: 2000   Screening for   
malignant neoplasm of   
cervix                    Cervical cancer screen    [facility]  
Work Phone:   
[phone]  
   
                          Start: 1997 Hepatitis C screening Hepatitis C An  
tibody [facility]  
   
                          Start: 1995 COVID-19 Vaccine (1) COVID-19 Vaccin  
e (1) [facility]  
Work Phone:   
[phone]  
   
                          Start: 1994 HIV screening HIV screen   [facility]  
Work Phone:   
[phone]  
   
                          Start: 1980 COVID-19 Vaccine (#1) COVID-19 Vacci  
ne (#1) [facility]  
   
                                        Start: 1979   Hepatitis B   
vaccination                             Hepatitis B (HBV)   
Vaccine (1 of 3 -   
3-dose series)                          [facility]  
   
                          Start: 1979 Hepatitis C screening Hepatitis C sc  
reen [facility]  
Work Phone:   
[phone]  
   
                                                                 [facility]  
a   
[facility]  
Work Phone:   
[phone]  
   
                                                    NEGATED: Highlighted   
row has been ruled   
out!                                                Planned Goals not   
documented                              [facility]   
[facility] [address]  
Work Phone:   
[phone]  
  
  
  
Immunizations  
  
  
                      Immunization Date Immunization Notes      Care Provider Fa  
cility  
   
                                        2016          tetanus toxoid, redu  
wilder diphtheria   
toxoid, and acellular pertussis   
vaccine, adsorbed                                           [facility]  
   
                                        10-          influenza, injectabl  
e,   
quadrivalent, contains preservative                                         [facility]  
   
                                        10-          influenza virus vacc  
ine,   
unspecified formulation                                         [facility]  
   
                                        02-          influenza, injectabl  
e,   
quadrivalent, preservative free                                         [facility]  
   
                                        02-          tetanus toxoid, redu  
wilder diphtheria   
toxoid, and acellular pertussis   
vaccine, adsorbed                                           [facility]  
   
                                        2011          measles, mumps and r  
ubella virus   
vaccine                                                     [facility]  
   
                                        2011          tetanus toxoid, redu  
wilder diphtheria   
toxoid, and acellular pertussis   
vaccine, adsorbed                                           [facility]  
  
  
  
Payers  
  
  
                          Date         Payer Category Payer        Policy ID  
   
                                2021      Unknown         [employer]  
AGE   
[employer]   
[id number] 2021-Present   
[phone] [address]   
[place] [address]                        [id number]   
[id number]  
[id number]  
   
                          2017   Unknown                   [id number]  
   
                                2017      Unknown         [employer] -  
 HMO/PPO/POS   
[employer] PPO/CLASSIC/PLUS   
[id number]   
2017-Present [address] [place] [address] PPO            [id number]  
[id number]  
   
                          1979   Unknown                   [id number]   
[id number]  
  
  
  
Social History  
  
  
                          Date         Type         Detail       Facility  
   
                          Start: 1980 Sex Assigned At Birth Not on file  S  
[facility]  
Work Phone:   
[phone]  
   
                                        Start: 05-   Tobacco smoking   
status NHIS               Never smoker              [facility]  
Work Phone:   
[phone]  
   
                                                    Start: 2016  
End: 05-                         Tobacco use and   
exposure                  Never used                [facility]  
   
                          Start: 05- Alcohol intake Ex-drinker (finding)   
[facility]  
Work Phone:   
[phone]  
   
                                        Start: 05-   History SDOH Alcohol  
   
Frequency                 1                         [facility] [address]  
Work Phone:   
[phone]  
   
                                        Start: 05-   History SDOH Alcohol  
   
Std Drinks                99                        [place]  
Work Phone:   
[phone]  
   
                                        Start: 2016   Tobacco smoking   
status [facility]               Ex-smoker                 [facility]  
   
                                                    Start: 1997  
End: 2012                         History of tobacco   
use                       [facility]oker            [facility]  
   
                                                    Start: 1997  
End: 2012                         History of tobacco   
use                       Cigarette Smoker          [facility]  
   
                                        Start: 2016   Cigarettes smoked   
current (pack per   
day) - Reported           1                         [facility]  
   
                                Start: 2022 Alcohol intake  Current drinke  
r of   
alcohol (finding)                       [facility]  
   
                                Start: 2010 Tobacco Comment 6/9/10 encoura  
ged to   
quit                                    [facility]  
   
                                Start: 2010 Alcohol Comment 6/9/10 glass o  
f wine   
1-2 times/week but   
not since pregnant                      [facility]  
   
                                       Gender identity Not on file  [facility]  
   
                                                    NEGATED: Highlighted   
row                 -                   -                   [facility]  
2   
[address]  
Work Phone:   
[phone]  
  
  
  
Functional Status  
  
  
                          Date         Assessment   Result       Facility  
   
                                                    NEGATED: Highlighted   
row                       Functional performance    Functional status   
health issues are not   
documented Disease                      [facility]   
[facility] [address]  
Work Phone:   
[phone]  
  
  
  
Mental Status  
  
  
                          Date         Assessment   Result       Facility  
   
                                                    NEGATED: Highlighted   
row                                     Cognitive function   
[Interpretation]                        Cognitive status   
health issues are not   
documented Disease                      [facility]   
[facility] [address]  
Work Phone:   
[phone]  
  
  
  
Progress note 2022  
  
  
                                Note Date & Type Note            Facility  
   
                                        2022 Note     [facility] ID: [id number]  
Author: BAILEE Guzman.CNP  
Service: ?  
Author Type: Nurse Practitioner  
Type: Progress Notes  
Filed: 2022 12:01 PM  
Note Text:  
This note was created using Amb[employer].  
Subjective  
Sharon Fischer is a 41 year old female.  
Pt reports left sided throat pain and   
swelling x 2 days. Pain refers into  
left ear. No URI symptoms, otherwise   
feels well.  
The history is provided by the patient.  
Sore Throat  
This is a new problem. Episode onset: 2   
days. The problem has been  
gradually worsening. The pain is worse on   
the left side. There has been no  
fever. Associated symptoms include ear   
pain (left), neck pain and swollen  
glands. Pertinent negatives include no   
congestion, coughing, headaches or  
trouble swallowing. She has had no   
exposure to strep. She has tried  
acetaminophen for the symptoms. The   
treatment provided mild relief.  
Review of Systems  
Constitutional: Negative for chills,   
fatigue and fever.  
HENT: Positive for ear pain (left) and   
sore throat. Negative for  
congestion, postnasal drip, rhinorrhea,   
sinus pain and trouble swallowing.  
Respiratory: Negative for cough.  
Cardiovascular: Negative for chest pain.  
Musculoskeletal: Positive for neck pain.  
Allergic/Immunologic: Negative for   
immunocompromised state.  
Neurological: Negative for headaches.  
Hematological: Positive for adenopathy.  
PAST MEDICAL HISTORY  
Diagnosis Date  
- Eczema  
PAST SURGICAL HISTORY  
Procedure Laterality Date  
- GASTRIC BYPASS HX 2019  
- NONE  
ALLERGIES Amoxicillin (Bulk) and Sulfa   
(Sulfonamide Antibiotics)  
MEDICATIONS  
escitalopram oxalate (LEXAPRO) 20 mg   
tablet Take 1 tablet by mouth once  
daily.  
clindamycin (CLEOCIN) 300 mg capsule Take   
1 capsule by mouth three times  
daily for 7 days.  
prenatal vits w-ca,fe,fa(<1mg)(PRENATAL   
FORMULA TAB) Take one(1) tablet  
daily.  
History reviewed. No pertinent family   
history.  
Social History  
Tobacco Use  
- Smoking status: Current Every Day   
Smoker  
Types: Cigarettes  
- Smokeless tobacco: Never Used  
- Tobacco comment: 6 per day  
Substance Use Topics  
- Alcohol use: No  
- Drug use: No  
Objective  
/59   Pulse 62   Temp 36.9 ?C (98.4   
?F) (Temporal Artery)   Resp  
12   Wt 90.3 kg (199 lb)   LMP 2021   
(Approximate)   SpO2 100%  
Physical Exam  
Vitals and nursing note reviewed.  
Constitutional:  
Appearance: She is well-developed. She is   
not ill-appearing.  
HENT:  
Mouth/Throat:  
Mouth: Mucous membranes are moist.  
Pharynx: Uvula midline. Posterior   
oropharyngeal erythema present. No  
uvula swelling.  
Tonsils: No tonsillar exudate. 1+ on the   
right. 3+ on the left.  
Pulmonary:  
Effort: Pulmonary effort is normal.  
Lymphadenopathy:  
Cervical: Cervical adenopathy present.  
Right cervical: No superficial or deep   
cervical adenopathy.  
Left cervical: Superficial cervical   
adenopathy and deep cervical  
adenopathy present.  
Skin:  
General: Skin is warm and dry.  
Neurological:  
Mental Status: She is alert and oriented   
to person, place, and time.  
Assessment and Plan  
1. Acute tonsillitis, unspecified   
etiology  
Concern for possible early tonsillar   
abscess, will begin antibiotic  
treatment. If severe worsening or   
inability to swallow saliva/liquids, go  
to ER. If no improvement in the next 2-3   
days, worsening, see ENT for  
further evaluation. Supportive care with   
analgesics, warm salt water  
gargles, throat lozenges/sprays,   
increased fluids, rest.  
- clindamycin (CLEOCIN) 300 mg capsule;   
Take 1 capsule by mouth three  
times daily for 7 days. Dispense: 21   
capsule; Refill: 0                      [facility]  
  
  
  
Evaluation note  
  
  
                                Note Date & Type Note            Facility  
  
  
  
                                                    Evaluation note   
  
  
  
                                                    Diagnosis  
   
                                                      
  
  
Abnormal uterine bleeding (AUB)  
  
documented in this encounter  
[facility]  
Work Phone: [phone]  
  
Summary Purpose  
  
  
                                                      
  
  
  
Family History  
                                     Mother  
  
                                Name            Dates           Details  
   
                                                    Family history of diabetes samira marley(V18.0, Z83.3)  
                                                    Status:Active  
  
                                     Father  
  
                                Name            Dates           Details  
   
                                                    Family history of cerebrovas  
cular accident (CVA)(V17.1, Z82.3)  
                                                    Status:Active  
  
                                     Mother  
  
                                Name            Dates           Details  
   
                                                    Family history of diabetes samira marley(V18.0, Z83.3)  
                                                    Status:Active  
  
                                     Father  
  
                                Name            Dates           Details  
   
                                                    Family history of cerebrovas  
cular accident (CVA)(V17.1, Z82.3)  
                                                    Status:Active  
  
                                     Mother  
  
                                Name            Dates           Details  
   
                                                    Family history of diabetes m  
ellitus(V18.0, Z83.3)  
                                                    Status:Active  
  
                                     Father  
  
                                Name            Dates           Details  
   
                                                    Family history of cerebrovas  
cular accident (CVA)(V17.1, Z82.3)  
                                                    Status:Active  
  
  
  
Advance Directives  
                           Latest Code Status on File  
  
                          Code Status  Date Activated Date Inactivated Comments  
   
                          Full Code    2017 10:43 PM 2017 5:48 PM   
  
  
  
                                                                   
   
                          Full Code    2017 7:16 AM 2017 10:43 PM   
  
                           Latest Code Status on File  
  
                          Code Status  Date Activated Date Inactivated Comments  
   
                          Full Code    2017 10:43 PM 2017 5:48 PM   
  
                               Code Status History  
  
                          Code Status  Date Activated Date Inactivated Comments  
   
                          Full Code    2017 7:16 AM 2017 10:43 PM   
  
  
  
Procedure Findings  
  
  
                                                    Note  
   
                                                    Post Operative Note: Post-Pr  
ocedure Diagnosis: Morbid Obesity Procedure: 1.   
Laparoscopic Gastric Bypass 2. Hiatal Hernia Repair 3. Upper Endoscopy 4. 
Bilateral   
TAP blocks 5. Surgeon: Bethany Resident/Fellow/Other Assistant: Chelsey Anesthesia:
   
General I.V. Fluids: 1000 cc crystalloid Estimated Blood Loss (mL): 10 cc 
Specimen:   
no Findings: as dictated Operative Report Dictated: Dictation: not applicable - 
note   
contains Operative Report Operative Report: Patient with morbid obesity who was   
scheduled on elective basis for a [place]-en-Y gastric bypass and possible hiatal 
hernia   
repair. She was given subcutaneous heparin was taken to the OR placed in supine   
position on the table, timeout was done, SCDs were placed, general anesthesia 
was   
established, antibiotics were given, standard, sterile preparation and draping 
of   
abdominal wall was performed. 5 mm optical trocar was used to enter into 
abdominal   
cavity without technical problems in left upper quadrant area. 12 Millimeter 
ports   
were placed i (more content not included)...  
  
  
  
Additional Source Comments  
  
  
  
                                                    INFORMATION SOURCE (unrecogn  
ized section and content)  
   
                                          
  
  
  
                                        DATE CREATED        AUTHOR  
   
                                2018                      [facility]  
  
  
  
                                DATE CREATED    AUTHOR          AUTHOR'S ORGANIZ  
ATION  
   
                                2020                      [facility]  
  
  
  
                                DATE CREATED    AUTHOR          AUTHOR'S ORGANIZ  
ATION  
   
                                2020                       [employer]  
  
  
  
                                DATE CREATED    AUTHOR          AUTHOR'S ORGANIZ  
ATION  
   
                                2021                      [employer]  
tem  
  
  
  
                                DATE CREATED    AUTHOR          AUTHOR'S ORGANIZ  
ATION  
   
                                2022                      [employer]   
System  
  
  
  
                                DATE CREATED    AUTHOR          AUTHOR'S ORGANIZ  
ATION  
   
                                2022                      [facility]  
  
  
FOR RECORDS PERTAINING TO PATIENTS WHO ARE OR HAVE BEEN ENROLLED IN A CHEMICAL 
DEPENDENCY/SUBSTANCEABUSE PROGRAM, SOME INFORMATION MAY BE OMITTED. This 
clinical summary was aggregated from multiple sources. Caution should be 
exercised in using it in the provision of clinical care. This summary normalizes
 information from multiple sources, and as a consequence, information in this 
document may materially change the coding, format and clinical context of 
patient data. In addition, data may be omitted in some cases. CLINICAL DECISIONS
 SHOULD BE BASED ON THE PRIMARY CLINICAL RECORDS. [facility] [facility]. provides
 no warranty or guarantee of the accuracy or completeness of information in this
 document.